# Patient Record
Sex: MALE | Race: WHITE | NOT HISPANIC OR LATINO | Employment: OTHER | URBAN - METROPOLITAN AREA
[De-identification: names, ages, dates, MRNs, and addresses within clinical notes are randomized per-mention and may not be internally consistent; named-entity substitution may affect disease eponyms.]

---

## 2019-09-09 ENCOUNTER — OFFICE VISIT (OUTPATIENT)
Dept: PODIATRY | Facility: CLINIC | Age: 69
End: 2019-09-09
Payer: COMMERCIAL

## 2019-09-09 VITALS
HEIGHT: 71 IN | WEIGHT: 240.8 LBS | HEART RATE: 88 BPM | BODY MASS INDEX: 33.71 KG/M2 | SYSTOLIC BLOOD PRESSURE: 148 MMHG | DIASTOLIC BLOOD PRESSURE: 61 MMHG

## 2019-09-09 DIAGNOSIS — E11.42 DIABETIC POLYNEUROPATHY ASSOCIATED WITH TYPE 2 DIABETES MELLITUS (HCC): Primary | ICD-10-CM

## 2019-09-09 PROCEDURE — 99213 OFFICE O/P EST LOW 20 MIN: CPT | Performed by: PODIATRIST

## 2019-09-09 NOTE — PROGRESS NOTES
Assessment/Plan:    Discussed principles of diabetic foot care  Patient is insensate for the most part but does have adequate arterial supply to his feet  Both feet legs are stain with hemosiderin secondary to venous insufficiency  Patient knows to refrain from walking barefoot  Yearly evaluation is recommended  No problem-specific Assessment & Plan notes found for this encounter  Diagnoses and all orders for this visit:    Diabetic polyneuropathy associated with type 2 diabetes mellitus ([address] )    Other orders  -     ASPIRIN 81 PO; Take 81 mg by mouth          Subjective:      Patient ID: Khurram Treviño is a 71 y o  male  HPI     Patient, a 70-year-old male with known diabetic neuropathy presents for diabetic foot assessment  Patient states that overall he is doing well  He has numbness in his feet but is very careful to always wear shoes  Patient taking insulin and states his blood sugar is under good control with last A1c at 6 5  Patient also has known venous insufficiency  The following portions of the patient's history were reviewed and updated as appropriate: allergies, current medications, past family history, past medical history, past social history, past surgical history and problem list     Review of Systems   Constitutional: Negative  Respiratory: Negative  Cardiovascular:        Venous insufficiency   Neurological: Positive for numbness  Objective:      /61   Pulse 88   Ht 5' 11" (1 803 m) Comment: verbal  Wt 109 kg (240 lb 12 8 oz)   BMI 33 58 kg/m²          Physical Exam   Cardiovascular: Pulses are weak pulses  Pulses:       Dorsalis pedis pulses are 2+ on the right side, and 2+ on the left side  Posterior tibial pulses are 0 on the right side, and 0 on the left side  Feet:   Right Foot:   Skin Integrity: Negative for ulcer, skin breakdown, erythema, warmth, callus or dry skin     Left Foot:   Skin Integrity: Negative for ulcer, skin breakdown, erythema, warmth, callus or dry skin  Diabetic Foot Exam    Patient's shoes and socks removed  Right Foot/Ankle   Right Foot Inspection  Skin Exam: skin normal and skin intact no dry skin, no warmth, no callus, no erythema, no maceration, no abnormal color, no pre-ulcer, no ulcer and no callus                          Toe Exam: ROM and strength within normal limits  Sensory   Vibration: absent  Proprioception: intact   Monofilament testing: diminished  Vascular  Capillary refills: < 3 seconds  The right DP pulse is 2+  The right PT pulse is 0  Right Toe  - Comprehensive Exam  Ecchymosis: none  Arch: normal  Hammertoes: absent  Claw Toes: absent  Swelling: none   Tenderness: none         Left Foot/Ankle  Left Foot Inspection  Skin Exam: skin normal and skin intactno dry skin, no warmth, no erythema, no maceration, normal color, no pre-ulcer, no ulcer and no callus                         Toe Exam: ROM and strength within normal limits                   Sensory   Vibration: absent  Proprioception: intact  Monofilament: diminished  Vascular  Capillary refills: < 3 seconds  The left DP pulse is 2+  The left PT pulse is 0  Left Toe  - Comprehensive Exam  Ecchymosis: none  Arch: normal  Hammertoes: absent  Claw toes: absent  Swelling: none   Tenderness: none       Assign Risk Category:  No deformity present;  Loss of protective sensation; Weak pulses       Risk: 1

## 2020-03-12 ENCOUNTER — OFFICE VISIT (OUTPATIENT)
Dept: PODIATRY | Facility: CLINIC | Age: 70
End: 2020-03-12
Payer: COMMERCIAL

## 2020-03-12 VITALS
HEART RATE: 92 BPM | DIASTOLIC BLOOD PRESSURE: 77 MMHG | SYSTOLIC BLOOD PRESSURE: 134 MMHG | HEIGHT: 71 IN | BODY MASS INDEX: 33.58 KG/M2

## 2020-03-12 DIAGNOSIS — S90.821A BLISTER OF FOOT, RIGHT, INITIAL ENCOUNTER: Primary | ICD-10-CM

## 2020-03-12 DIAGNOSIS — E11.42 DIABETIC POLYNEUROPATHY ASSOCIATED WITH TYPE 2 DIABETES MELLITUS (HCC): ICD-10-CM

## 2020-03-12 PROCEDURE — 99213 OFFICE O/P EST LOW 20 MIN: CPT | Performed by: PODIATRIST

## 2020-03-12 RX ORDER — CHLORTHALIDONE 25 MG/1
TABLET ORAL
COMMUNITY
Start: 2018-09-10

## 2020-03-12 RX ORDER — MELATONIN
5
COMMUNITY

## 2020-03-12 RX ORDER — INSULIN ASPART 100 [IU]/ML
INJECTION, SUSPENSION SUBCUTANEOUS
COMMUNITY
Start: 2012-11-09

## 2020-03-12 RX ORDER — KRILL/OM-3/DHA/EPA/PHOSPHO/AST 500MG-86MG
1 CAPSULE ORAL
COMMUNITY

## 2020-03-12 RX ORDER — RIBOFLAVIN (VITAMIN B2) 100 MG
1 TABLET ORAL
COMMUNITY

## 2020-03-12 RX ORDER — MAGNESIUM 200 MG
1 TABLET ORAL
COMMUNITY

## 2020-03-12 RX ORDER — ATORVASTATIN CALCIUM 20 MG/1
TABLET, FILM COATED ORAL
COMMUNITY
Start: 2020-01-13

## 2020-03-12 RX ORDER — LISINOPRIL 10 MG/1
1 TABLET ORAL DAILY
COMMUNITY
Start: 2013-07-26

## 2020-03-12 NOTE — PROGRESS NOTES
Assessment/Plan:  Explained to patient that he is dealing with a blister on the bottom of the right foot that is healing without evidence of infection or problem  He does not need to apply antibiotics to the area  No aggressive treatment needed  Patient will be rescheduled in approximately 6 months for diabetic foot exam  No problem-specific Assessment & Plan notes found for this encounter  Diagnoses and all orders for this visit:    Blister of foot, right, initial encounter    Diabetic polyneuropathy associated with type 2 diabetes mellitus ([facility] [address] )    Other orders  -     insulin aspart protamine-insulin aspart (NovoLOG 70/30) 100 units/mL injection; Inject under the skin  -     insulin glargine (Toujeo SoloStar) 300 units/mL CONCETRATED U-300 injection pen (1-unit dial); INJECT 28 UNITS UNDER THE SKIN DAILY  -     atorvastatin (LIPITOR) 20 mg tablet  -     Ascorbic Acid (VITAMIN C) 100 MG tablet; Take 1 tablet by mouth  -     chlorthalidone 25 mg tablet; TAKE ONE-HALF TABLET ONCE DAILY  -     cholecalciferol (VITAMIN D3) 1,000 units tablet; Take 5 tablets by mouth  -     Cyanocobalamin 1000 MCG SUBL; Take 1 tablet by mouth  -     insulin glargine (TOUJEO MAX) 300 units/mL CONCETRATED U-300 injection pen (2-unit dial); as directed  -     Krill Oil 500 MG CAPS; 1 capsule  -     liraglutide (Victoza) injection; Inject under the skin  -     lisinopril (ZESTRIL) 10 mg tablet; Take 1 tablet by mouth daily  -     Magnesium 200 MG TABS; 1 tablet          Subjective:      Patient ID: Priscila Arguello is a 79 y o  male  HPI     Patient, a 51-year-old diabetic with known neuropathy along with renal disease presents with concern regarding his right foot  Patient was in [place] and was walking on the beach approximately 1 week ago  He developed a blister on the bottom of the right foot  He initially had pain but now the pain is gone    Patient wants to make sure that everything was okay and that he is not infected  The following portions of the patient's history were reviewed and updated as appropriate: allergies, current medications, past family history, past medical history, past social history, past surgical history and problem list     Review of Systems   Cardiovascular:        Venous disease   Genitourinary:        Chronic renal disease   Musculoskeletal: Negative  Neurological: Positive for numbness  Hematological: Negative  Objective:      /77   Pulse 92   Ht 5' 11" (1 803 m)   BMI 33 58 kg/m²          Physical Exam   Cardiovascular:   DP +2/4 bilateral; PT 0/4 bilateral; both legs are stained with hemosiderin  Musculoskeletal: Normal range of motion  He exhibits no deformity  Neurological: He exhibits normal muscle tone  Skin:   Resolving blister present beneath 1st metatarsal head right foot    No drainage or sign of infection

## 2020-09-08 ENCOUNTER — OFFICE VISIT (OUTPATIENT)
Dept: PODIATRY | Facility: CLINIC | Age: 70
End: 2020-09-08
Payer: COMMERCIAL

## 2020-09-08 VITALS
BODY MASS INDEX: 33.58 KG/M2 | HEART RATE: 101 BPM | TEMPERATURE: 97 F | HEIGHT: 71 IN | DIASTOLIC BLOOD PRESSURE: 75 MMHG | SYSTOLIC BLOOD PRESSURE: 117 MMHG

## 2020-09-08 DIAGNOSIS — M76.61 ACHILLES TENDINITIS OF RIGHT LOWER EXTREMITY: ICD-10-CM

## 2020-09-08 DIAGNOSIS — E11.42 DIABETIC POLYNEUROPATHY ASSOCIATED WITH TYPE 2 DIABETES MELLITUS (HCC): Primary | ICD-10-CM

## 2020-09-08 PROCEDURE — 99213 OFFICE O/P EST LOW 20 MIN: CPT | Performed by: PODIATRIST

## 2021-03-09 ENCOUNTER — OFFICE VISIT (OUTPATIENT)
Dept: PODIATRY | Facility: CLINIC | Age: 71
End: 2021-03-09
Payer: COMMERCIAL

## 2021-03-09 VITALS
WEIGHT: 242 LBS | HEART RATE: 94 BPM | SYSTOLIC BLOOD PRESSURE: 145 MMHG | BODY MASS INDEX: 33.88 KG/M2 | HEIGHT: 71 IN | DIASTOLIC BLOOD PRESSURE: 80 MMHG

## 2021-03-09 DIAGNOSIS — E11.42 DIABETIC POLYNEUROPATHY ASSOCIATED WITH TYPE 2 DIABETES MELLITUS (HCC): Primary | ICD-10-CM

## 2021-03-09 DIAGNOSIS — L98.9 SKIN ABNORMALITIES: ICD-10-CM

## 2021-03-09 PROCEDURE — 99213 OFFICE O/P EST LOW 20 MIN: CPT | Performed by: PODIATRIST

## 2021-03-09 RX ORDER — OMEGA-3 FATTY ACIDS CAP DELAYED RELEASE 1000 MG 1000 MG
4000 CAPSULE DELAYED RELEASE ORAL
COMMUNITY

## 2021-03-09 RX ORDER — FUROSEMIDE 20 MG/1
10 TABLET ORAL DAILY
COMMUNITY

## 2021-03-09 RX ORDER — MAGNESIUM GLUCONATE 30 MG(550)
30 TABLET ORAL DAILY
COMMUNITY

## 2021-03-09 RX ORDER — PATIROMER 8.4 G/1
POWDER, FOR SUSPENSION ORAL
COMMUNITY
Start: 2021-03-04

## 2021-03-09 RX ORDER — EXENATIDE 250 UG/ML
INJECTION SUBCUTANEOUS
COMMUNITY
Start: 2013-08-21

## 2021-03-09 RX ORDER — FLUOCINONIDE 0.5 MG/G
OINTMENT TOPICAL 2 TIMES DAILY
Qty: 30 G | Refills: 0 | Status: SHIPPED | OUTPATIENT
Start: 2021-03-09

## 2021-03-09 RX ORDER — PANTOPRAZOLE SODIUM 40 MG/1
40 TABLET, DELAYED RELEASE ORAL
COMMUNITY
Start: 2020-11-23

## 2021-03-09 NOTE — PROGRESS NOTES
Assessment/Plan:      Patient has known diabetic neuropathy along with known renal disease and poor circulation  He knows to refrain from walking barefoot  Lidex ointment was prescribed for this skin fissure on the left foot  He will apply b i d  to the affected area  He is rescheduled in 6 months  No problem-specific Assessment & Plan notes found for this encounter  Diagnoses and all orders for this visit:    Diabetic polyneuropathy associated with type 2 diabetes mellitus ([facility] [address] )    Skin abnormalities  -     fluocinonide (LIDEX) 0 05 % ointment; Apply topically 2 (two) times a day    Other orders  -     Omega-3 Fatty Acids (Fish Oil) 1000 MG CPDR; Take 4,000 Units by mouth  -     Magnesium Gluconate 550 MG TABS; Take 30 mg by mouth daily  -     vitamin E 100 UNIT capsule; Take 100 Units by mouth daily  -     Cholecalciferol 1 25 MG (06462 UT) TABS; Take 5,000 Units by mouth daily  -     Exenatide (Byetta 10 MCG Pen) 10 MCG/0 04ML SOPN; Inject under the skin  -     furosemide (LASIX) 20 mg tablet; Take 10 mg by mouth daily   -     pantoprazole (PROTONIX) 40 mg tablet; Take 40 mg by mouth  -     Veltassa 8 4 g PACK; TAKE 1 PACKET BY MOUTH DAILY  -     insulin aspart (NovoLOG) 100 units/mL injection; Inject under the skin          Subjective:      Patient ID: Miah Coleman is a 70 y o  male  HPI       Patient, a 72-year-old type 2 diabetic utilizing insulin for control presents for examination and care  Patient states that he has recently been hospitalized for pericarditis  Also that he has stage IV renal disease  No A1c is noted in the chart but blood sugar is consistently in the range of 200  Both soles of feet are numb per [place] monofilament however patient complains of pain due to a small fissure within the left arch  Blood glucose close level dated 03/05/21 was 250  BUN was markedly elevated as well as creatinine       Blood glucose level of 12/28/2020 was 196 with a BUN of 92         The following portions of the patient's history were reviewed and updated as appropriate: allergies, current medications, past family history, past medical history, past social history, past surgical history and problem list     Review of Systems   Cardiovascular:        History of cardiac disease; history of venous insufficiency   Genitourinary:        Stage IV renal disease   Musculoskeletal: Negative  Neurological: Positive for numbness  Psychiatric/Behavioral: Negative  Objective:      /80   Pulse 94   Ht 5' 11" (1 803 m) Comment: verbal  Wt 110 kg (242 lb)   BMI 33 75 kg/m²          Physical Exam  Cardiovascular:      Pulses: Pulses are weak  Dorsalis pedis pulses are 1+ on the right side and 1+ on the left side  Posterior tibial pulses are 0 on the right side and 0 on the left side  Feet:      Right foot:      Skin integrity: No ulcer, skin breakdown, erythema, warmth, callus or dry skin  Left foot:      Skin integrity: No ulcer, skin breakdown, erythema, warmth, callus or dry skin  Diabetic Foot Exam    Patient's shoes and socks removed  Right Foot/Ankle   Right Foot Inspection  Skin Exam: skin normal, skin intact and abnormal color no dry skin, no warmth, no callus, no erythema, no maceration, no pre-ulcer, no ulcer and no callus                          Toe Exam: ROM and strength within normal limits  Sensory   Vibration: absent  Proprioception: intact   Monofilament testing: absent  Vascular  Capillary refills: elevated  The right DP pulse is 1+  The right PT pulse is 0     Right Toe  - Comprehensive Exam  Ecchymosis: none  Arch: normal  Hammertoes: absent  Claw Toes: absent  Swelling: none   Tenderness: none         Left Foot/Ankle  Left Foot Inspection  Skin Exam: skin normal, skin intact and abnormal colorno dry skin, no warmth, no erythema, no maceration, no pre-ulcer, no ulcer and no callus                         Toe Exam: ROM and strength within normal limits                   Sensory   Vibration: absent  Proprioception: intact  Monofilament: absent  Vascular  Capillary refills: elevated  The left DP pulse is 1+  The left PT pulse is 0  Left Toe  - Comprehensive Exam  Ecchymosis: none  Arch: normal  Hammertoes: absent  Claw toes: absent  Swelling: none   Tenderness: none       Assign Risk Category:  No deformity present;  No loss of protective sensation; Weak pulses       Risk: 1

## 2022-02-08 ENCOUNTER — OFFICE VISIT (OUTPATIENT)
Dept: PODIATRY | Facility: CLINIC | Age: 72
End: 2022-02-08
Payer: COMMERCIAL

## 2022-02-08 ENCOUNTER — TELEPHONE (OUTPATIENT)
Dept: PODIATRY | Facility: CLINIC | Age: 72
End: 2022-02-08

## 2022-02-08 VITALS
HEIGHT: 71 IN | DIASTOLIC BLOOD PRESSURE: 75 MMHG | WEIGHT: 247 LBS | SYSTOLIC BLOOD PRESSURE: 119 MMHG | BODY MASS INDEX: 34.58 KG/M2 | HEART RATE: 94 BPM

## 2022-02-08 DIAGNOSIS — E11.42 DIABETIC POLYNEUROPATHY ASSOCIATED WITH TYPE 2 DIABETES MELLITUS (HCC): Primary | ICD-10-CM

## 2022-02-08 DIAGNOSIS — S90.851A FOREIGN BODY IN RIGHT FOOT, INITIAL ENCOUNTER: ICD-10-CM

## 2022-02-08 PROCEDURE — 99213 OFFICE O/P EST LOW 20 MIN: CPT | Performed by: PODIATRIST

## 2022-02-08 RX ORDER — CEPHALEXIN 500 MG/1
500 CAPSULE ORAL 4 TIMES DAILY
Qty: 40 CAPSULE | Refills: 0 | Status: SHIPPED | OUTPATIENT
Start: 2022-02-08 | End: 2022-02-18

## 2022-02-08 RX ORDER — SIMVASTATIN 20 MG
20 TABLET ORAL
COMMUNITY

## 2022-02-08 NOTE — TELEPHONE ENCOUNTER
Spoke to [employer] daughter and scheduled pt to be seen today at 2:45pm in our [place] office with Dr Posada Scarce

## 2022-02-08 NOTE — PROGRESS NOTES
Assessment/Plan:    I personally reviewed x-rays of the right foot  There is no evidence of foreign body such as a nail  Patient placed on cephalexin 500 mg q i d   Patient advised to contact me should foot become swollen or drainage occur from the puncture wound  Patient told to go to the ED for a tetanus shot  No problem-specific Assessment & Plan notes found for this encounter  Diagnoses and all orders for this visit:    Diabetic polyneuropathy associated with type 2 diabetes mellitus ([address] )    Foreign body in right foot, initial encounter  -     X-ray foot right 3+ views; Future  -     cephalexin (KEFLEX) 500 mg capsule; Take 1 capsule (500 mg total) by mouth 4 (four) times a day for 10 days    Other orders  -     simvastatin (ZOCOR) 20 mg tablet; Take 20 mg by mouth          Subjective:      Patient ID: Marvin Lowe is a 67 y o  male  HPI     Patient, a 77-year-old male with known diabetic neuropathy presents with concern regarding his right foot  Approximately 2 hours earlier he stepped on a nail that went through his shoe and sock  He has no pain as he is neuropathic  Patient does not recall having a recent tetanus shot  The following portions of the patient's history were reviewed and updated as appropriate: allergies, current medications, past family history, past medical history, past social history, past surgical history and problem list     Review of Systems   Genitourinary:        Renal disease   Musculoskeletal: Negative  Neurological: Positive for numbness  Psychiatric/Behavioral: Negative  Objective:      /75   Pulse 94   Ht 5' 11" (1 803 m)   Wt 112 kg (247 lb)   BMI 34 45 kg/m²          Physical Exam  Constitutional:       Appearance: Normal appearance  Cardiovascular:      Comments: No palpable pedal pulses bilateral; poor skin turgor  Musculoskeletal:         General: Signs of injury present        Comments: Extremely small puncture wound noted plantar aspect right foot in the region of the 1st metatarsal interspace  There is no bleeding  No evidence of cellulitis  No drainage present   Skin:     Findings: Erythema present  Comments: Bilateral erythema but this erythema is present prior to stepping on foreign body  Neurological:      General: No focal deficit present  Mental Status: He is oriented to person, place, and time  Sensory: Sensory deficit present

## 2022-02-08 NOTE — TELEPHONE ENCOUNTER
Jeri called, Darryl [place] a nail  He does not think he got the entire nail out of his foot  As he is concerned, they were hoping he could be seen today    Call either way, call Darryl Raymond first and if he does not answer, call Meryl Li @ [phone]

## 2023-03-16 ENCOUNTER — OFFICE VISIT (OUTPATIENT)
Dept: PODIATRY | Facility: CLINIC | Age: 73
End: 2023-03-16

## 2023-03-16 ENCOUNTER — TELEPHONE (OUTPATIENT)
Dept: PODIATRY | Facility: CLINIC | Age: 73
End: 2023-03-16

## 2023-03-16 VITALS
HEART RATE: 106 BPM | WEIGHT: 246 LBS | DIASTOLIC BLOOD PRESSURE: 83 MMHG | BODY MASS INDEX: 34.44 KG/M2 | SYSTOLIC BLOOD PRESSURE: 132 MMHG | HEIGHT: 71 IN

## 2023-03-16 DIAGNOSIS — N28.9 RENAL DISEASE: ICD-10-CM

## 2023-03-16 DIAGNOSIS — E11.42 DIABETIC POLYNEUROPATHY ASSOCIATED WITH TYPE 2 DIABETES MELLITUS (HCC): ICD-10-CM

## 2023-03-16 DIAGNOSIS — I87.2 PERIPHERAL VENOUS INSUFFICIENCY: Primary | ICD-10-CM

## 2023-03-16 NOTE — PROGRESS NOTES
Assessment/Plan:    Explained to patient that he is dealing with severe venous insufficiency and marked varicosities  He was referred to vascular for their assessment and care  No problem-specific Assessment & Plan notes found for this encounter  Diagnoses and all orders for this visit:    Peripheral venous insufficiency  -     Ambulatory Referral to Vascular Surgery; Future    Diabetic polyneuropathy associated with type 2 diabetes mellitus (HCC)    Renal disease          Subjective:      Patient ID: Lionel Carias is a 68 y o  male  HPI     Patient, a 60-year-old type II diabetic male with known stage IV renal disease presents with concern regarding the color of his legs  He also is concerned as he feels that he has blisters on his legs that are about to burst   He has not had any oozing or drainage at this time  He states that he is taking a diuretic to keep leg swelling under control  No foot discomfort related but it is noted the patient has significant diabetic neuropathy and is insensate  The following portions of the patient's history were reviewed and updated as appropriate: allergies, current medications, past family history, past medical history, past social history, past surgical history and problem list     Review of Systems   Genitourinary:        Stage IV renal disease   Neurological: Positive for numbness  Psychiatric/Behavioral: Negative  Objective:      /83 (BP Location: Left arm, Patient Position: Sitting, Cuff Size: Standard)   Pulse (!) 106   Ht 5' 11" (1 803 m)   Wt 112 kg (246 lb)   BMI 34 31 kg/m²          Physical Exam  Constitutional:       Appearance: Normal appearance  Cardiovascular:      Pulses: Normal pulses  Comments: Both legs are brown/black secondary to venous insufficiency  Severe varicosities present each lower extremity  Genitourinary:     Rectum: Guaiac stool:      Musculoskeletal:         General: Normal range of motion  Skin:     Comments: Hemosiderin deposits lower legs bilateral   Neurological:      General: No focal deficit present  Mental Status: He is oriented to person, place, and time  Sensory: Sensory deficit present  Comments: Patient is insensate soles of both feet

## 2023-03-16 NOTE — TELEPHONE ENCOUNTER
Caller: Erin Adam    Doctor/Office: Dr Romy Pereira    #: [phone]    Escalation: Appointment Patient and his daughter Clemencia Cantu ([phone]) both called in together to get an appt for sores on his lower legs with blisters  Unable to take todays same day slot or any upcoming appts I could offer before 4-17  He took that appt but is requesting to be seen sooner if possible  Please call and advise

## 2023-03-20 NOTE — TELEPHONE ENCOUNTER
Spoke to patient who states that he was seen in the office by Dr Francisco Javier Cash on 3/16/23 and ref for a vascular consult (scheduled for May 2023)  Pt states he inspected his legs, feet and toes daily and will be in touch with the office again if anything changes as far as the status of his "wounds"

## 2023-05-19 ENCOUNTER — CONSULT (OUTPATIENT)
Dept: VASCULAR SURGERY | Facility: CLINIC | Age: 73
End: 2023-05-19

## 2023-05-19 VITALS
OXYGEN SATURATION: 98 % | DIASTOLIC BLOOD PRESSURE: 82 MMHG | BODY MASS INDEX: 32.79 KG/M2 | WEIGHT: 234.2 LBS | HEART RATE: 97 BPM | SYSTOLIC BLOOD PRESSURE: 130 MMHG | HEIGHT: 71 IN

## 2023-05-19 DIAGNOSIS — I83.93 ASYMPTOMATIC VARICOSE VEINS OF BOTH LOWER EXTREMITIES: Primary | ICD-10-CM

## 2023-05-19 DIAGNOSIS — I87.2 VENOUS STASIS DERMATITIS OF BOTH LOWER EXTREMITIES: ICD-10-CM

## 2023-05-19 DIAGNOSIS — I87.2 PERIPHERAL VENOUS INSUFFICIENCY: ICD-10-CM

## 2023-05-19 RX ORDER — SEMAGLUTIDE 2.68 MG/ML
INJECTION, SOLUTION SUBCUTANEOUS
COMMUNITY
Start: 2023-05-10

## 2023-05-19 NOTE — ASSESSMENT & PLAN NOTE
Pt referred to the office for B/l leg swelling with venous stasis  Pt states he has had discoloration of the b/l legs for the past 10 years  He does not currently wear compression  No recent testing for review   -Palpable bilateral DP and PT pulses   -Pt noted to have large B/L varicose veins that are bulging  States these do not currently give him any pain or bother him at this time  Recommendations  -Return for f/u in 3 months to discuss leg swelling and alleviation of symptoms with compression and elevation    -Discussed with pt pathophysiology of venous insufficiency and treatment recommendations   -Conservative treatment measurements recommended at this time with use of b/l leg compression, elevation and increased exercise  -RX for compression given today with instruction on use

## 2023-05-19 NOTE — PROGRESS NOTES
Assessment/Plan:    Peripheral venous insufficiency  Pt referred to the office for B/l leg swelling with venous stasis  Pt states he has had discoloration of the b/l legs for the past 10 years  He does not currently wear compression  No recent testing for review   -Palpable bilateral DP and PT pulses   -Pt noted to have large B/L varicose veins that are bulging  States these do not currently give him any pain or bother him at this time  Recommendations  -Return for f/u in 3 months to discuss leg swelling and alleviation of symptoms with compression and elevation    -Discussed with pt pathophysiology of venous insufficiency and treatment recommendations   -Conservative treatment measurements recommended at this time with use of b/l leg compression, elevation and increased exercise  -RX for compression given today with instruction on use  Diagnoses and all orders for this visit:    Asymptomatic varicose veins of both lower extremities    Peripheral venous insufficiency  -     Ambulatory Referral to Vascular Surgery  -     Compression Stocking  -     Compression Stocking    Venous stasis dermatitis of both lower extremities    Other orders  -     Ozempic, 2 MG/DOSE, 8 MG/3ML injection pen; INJECT 2 MG SUBCUTANEOUSLY ONE TIME PER WEEK          Subjective:      Patient ID: Alfonso Gordon is a 68 y o  male  Patient is new to our office, referred by Eric Du DPM  Patient presents today B/L PVD  He c/o discoloration in both legs, numbness and tingling in his feet  He does have bulging veins on both legs  He denies any wounds, claudication, pain with elevation  Patient takes ASA 81mg and Atorvastatin  Patient has never smoked  35-year-old male with PMHx diabetes, pericarditis, obesity with BMI 32 66 kg/m, varicose veins in the office today as new referral by Eric Du, his podiatrist      States he does not have any leg pain  States he has b/l leg swelling that comes and goes   He states he "takes a water pill and that helps with his swelling  States the b/l leg discoloration started 10 years ago  He states he puts Eucerin lotion on his legs every day  States when he stands in one spot for a length of time his R leg will fall asleep  Denies claudication, denies rest pain, denies wounds  Currently taking aspirin 325 mg and atorvastatin daily  The following portions of the patient's history were reviewed and updated as appropriate: allergies, current medications, past family history, past medical history, past social history, past surgical history and problem list     Review of Systems   Constitutional: Negative  HENT: Negative  Eyes: Negative  Respiratory: Negative  Negative for shortness of breath  Cardiovascular: Positive for leg swelling  Negative for chest pain  Gastrointestinal: Negative  Endocrine: Negative  Genitourinary: Negative  Musculoskeletal: Negative  Skin: Positive for color change  Allergic/Immunologic: Negative  Neurological: Negative  Hematological: Negative  Psychiatric/Behavioral: Negative  Objective:      /82 (BP Location: Right arm, Patient Position: Sitting, Cuff Size: Standard)   Pulse 97   Ht 5' 11\" (1 803 m)   Wt 106 kg (234 lb 3 2 oz)   SpO2 98%   BMI 32 66 kg/m²          Physical Exam      I have reviewed and made appropriate changes to the review of systems input by the medical assistant  Vitals:    05/19/23 1503   BP: 130/82   BP Location: Right arm   Patient Position: Sitting   Cuff Size: Standard   Pulse: 97   SpO2: 98%   Weight: 106 kg (234 lb 3 2 oz)   Height: 5' 11\" (1 803 m)       Patient Active Problem List   Diagnosis   • Peripheral venous insufficiency       History reviewed  No pertinent surgical history      Family History   Problem Relation Age of Onset   • Cancer Family    • Diabetes Family    • Heart disease Family    • Hypertension Family        Social History     Socioeconomic History   • " Marital status: /Civil Union     Spouse name: Not on file   • Number of children: Not on file   • Years of education: Not on file   • Highest education level: Not on file   Occupational History   • Not on file   Tobacco Use   • Smoking status: Never   • Smokeless tobacco: Never   Vaping Use   • Vaping Use: Never used   Substance and Sexual Activity   • Alcohol use: Not Currently   • Drug use: Not on file   • Sexual activity: Not on file   Other Topics Concern   • Not on file   Social History Narrative   • Not on file     Social Determinants of Health     Financial Resource Strain: Not on file   Food Insecurity: Not on file   Transportation Needs: Not on file   Physical Activity: Not on file   Stress: Not on file   Social Connections: Not on file   Intimate Partner Violence: Not on file   Housing Stability: Not on file       Allergies   Allergen Reactions   • Cat Hair Extract    • Morphine Other (See Comments)         Current Outpatient Medications:   •  Ascorbic Acid (VITAMIN C) 100 MG tablet, Take 1 tablet by mouth, Disp: , Rfl:   •  ASPIRIN 81 PO, Take 81 mg by mouth, Disp: , Rfl:   •  chlorthalidone 25 mg tablet, TAKE ONE-HALF TABLET ONCE DAILY, Disp: , Rfl:   •  cholecalciferol (VITAMIN D3) 1,000 units tablet, Take 5 tablets by mouth, Disp: , Rfl:   •  Cholecalciferol 1 25 MG (08748 UT) TABS, Take 5,000 Units by mouth daily, Disp: , Rfl:   •  Cyanocobalamin 1000 MCG SUBL, Take 1 tablet by mouth, Disp: , Rfl:   •  Exenatide (Byetta 10 MCG Pen) 10 MCG/0 04ML SOPN, Inject under the skin, Disp: , Rfl:   •  fluocinonide (LIDEX) 0 05 % ointment, Apply topically 2 (two) times a day, Disp: 30 g, Rfl: 0  •  furosemide (LASIX) 20 mg tablet, Take 10 mg by mouth daily , Disp: , Rfl:   •  insulin aspart (NovoLOG) 100 units/mL injection, Inject under the skin, Disp: , Rfl:   •  insulin aspart protamine-insulin aspart (NovoLOG 70/30) 100 units/mL injection, Inject under the skin, Disp: , Rfl:   •  insulin glargine (TOUJEO MAX) 300 units/mL CONCETRATED U-300 injection pen (2-unit dial), as directed, Disp: , Rfl:   •  insulin glargine (TOUJEO) 300 units/mL CONCENTRATED U-300 injection pen (1-unit dial), INJECT 28 UNITS UNDER THE SKIN DAILY  , Disp: , Rfl:   •  Krill Oil 500 MG CAPS, 1 capsule, Disp: , Rfl:   •  liraglutide (VICTOZA) injection, Inject under the skin, Disp: , Rfl:   •  lisinopril (ZESTRIL) 10 mg tablet, Take 1 tablet by mouth daily, Disp: , Rfl:   •  Magnesium 200 MG TABS, 1 tablet, Disp: , Rfl:   •  Magnesium Gluconate 550 MG TABS, Take 30 mg by mouth daily, Disp: , Rfl:   •  Omega-3 Fatty Acids (Fish Oil) 1000 MG CPDR, Take 4,000 Units by mouth, Disp: , Rfl:   •  Ozempic, 2 MG/DOSE, 8 MG/3ML injection pen, INJECT 2 MG SUBCUTANEOUSLY ONE TIME PER WEEK, Disp: , Rfl:   •  pantoprazole (PROTONIX) 40 mg tablet, Take 40 mg by mouth, Disp: , Rfl:   •  Veltassa 8 4 g PACK, TAKE 1 PACKET BY MOUTH DAILY, Disp: , Rfl:   •  vitamin E 100 UNIT capsule, Take 100 Units by mouth daily, Disp: , Rfl:   •  atorvastatin (LIPITOR) 20 mg tablet, , Disp: , Rfl:   •  simvastatin (ZOCOR) 20 mg tablet, Take 20 mg by mouth (Patient not taking: Reported on 5/19/2023), Disp: , Rfl:   I have spent a total time of 40 minutes on 05/19/23 in caring for this patient including Risks and benefits of tx options, Instructions for management, Patient and family education, Risk factor reductions, Impressions and Obtaining or reviewing history

## 2023-05-19 NOTE — PATIENT INSTRUCTIONS
"- Call the office if you experience any changes to your legs or feet such as new pain, redness or swelling     - Stay active  Exercise everyday  Walking is the recommended exercise with a goal of 30 min 3-4 times a week  A healthy weight can assist in decreasing varicose vein symptoms      - Wear Compression  Put them on in the morning, wear all day and take off before bed at night      -Elevate your legs above the level of your heart  Elevate for 15 minutes 3-4 times a day  -When looking at buying compression, look for \"gradient compression\" with a weight 20-30mmHg (medium weight), knee high is fine   -Try \"[employer]\"    -A good brand is [employer] and Sigvaris, knee high     "

## 2023-08-23 ENCOUNTER — OFFICE VISIT (OUTPATIENT)
Dept: VASCULAR SURGERY | Facility: CLINIC | Age: 73
End: 2023-08-23
Payer: COMMERCIAL

## 2023-08-23 VITALS
SYSTOLIC BLOOD PRESSURE: 110 MMHG | BODY MASS INDEX: 32.34 KG/M2 | WEIGHT: 231 LBS | DIASTOLIC BLOOD PRESSURE: 60 MMHG | HEIGHT: 71 IN | OXYGEN SATURATION: 100 % | HEART RATE: 95 BPM

## 2023-08-23 DIAGNOSIS — I87.2 PERIPHERAL VENOUS INSUFFICIENCY: Primary | ICD-10-CM

## 2023-08-23 DIAGNOSIS — R09.89 DECREASED PEDAL PULSES: ICD-10-CM

## 2023-08-23 PROCEDURE — 99213 OFFICE O/P EST LOW 20 MIN: CPT | Performed by: NURSE PRACTITIONER

## 2023-08-23 RX ORDER — INSULIN ASPART 100 [IU]/ML
INJECTION, SOLUTION INTRAVENOUS; SUBCUTANEOUS
COMMUNITY
Start: 2023-08-11

## 2023-08-23 RX ORDER — EMPAGLIFLOZIN 10 MG/1
10 TABLET, FILM COATED ORAL EVERY MORNING
COMMUNITY
Start: 2023-08-02

## 2023-08-23 NOTE — ASSESSMENT & PLAN NOTE
49-year-old male returns to the office for follow up with b/l leg swelling. Pt reports that he did not order or use compression or leg elevation since last office visit. States he was in [place] and just recently returned. Denies any new or worsening symptoms.     -Pt has significant b/l venous stasis dermatitis on b/l ankles with b/l leg swelling. He denies pain in the legs. -Denies claudication, rest pain, wounds/ tissue loss.  -Pt noted to have decreased b/l pedal pulses. Will obtain LEAD to r/o any arterial disease.  -Pt educated on pathophysiology of venous insuffiencey and indications for intervention. Educated on conservative treatment at this time and discussed importance of b/l compression, elevation and increased walking and weight loss. Pt verbalized understanding and is agreeable to this plan. Recommendations  -Complete LEAD and the office will call if you need to return prior to 3 months for rview. presents with symptomatic varicose veins. Recommendations   -Return to the office in 3 months for follow up.   -Start wearing compression. RX provided at prior visit, given education on how to use. -Leg elevation. Goal of 3-4 times a day 15 minutes at a time.  -Increase exercise and ambulation. Goal of 3-4 times a week for 30 min. -Apply moisturizing cream to the b/l legs to maintain skin integrity and prevent breakdown.  -Call the office with any new or worsening symptoms.

## 2023-08-23 NOTE — PROGRESS NOTES
Assessment/Plan:    Peripheral venous insufficiency  77-year-old male returns to the office for follow up with b/l leg swelling. Pt reports that he did not order or use compression or leg elevation since last office visit. States he was in [place] and just recently returned. Denies any new or worsening symptoms.     -Pt has significant b/l venous stasis dermatitis on b/l ankles with b/l leg swelling. He denies pain in the legs. -Denies claudication, rest pain, wounds/ tissue loss.  -Pt noted to have decreased b/l pedal pulses. Will obtain LEAD to r/o any arterial disease.  -Pt educated on pathophysiology of venous insuffiencey and indications for intervention. Educated on conservative treatment at this time and discussed importance of b/l compression, elevation and increased walking and weight loss. Pt verbalized understanding and is agreeable to this plan. Recommendations  -Complete LEAD and the office will call if you need to return prior to 3 months for rview. presents with symptomatic varicose veins. Recommendations   -Return to the office in 3 months for follow up.   -Start wearing compression. RX provided at prior visit, given education on how to use. -Leg elevation. Goal of 3-4 times a day 15 minutes at a time.  -Increase exercise and ambulation. Goal of 3-4 times a week for 30 min. -Apply moisturizing cream to the b/l legs to maintain skin integrity and prevent breakdown.  -Call the office with any new or worsening symptoms. Diagnoses and all orders for this visit:    Peripheral venous insufficiency    Decreased pedal pulses  -     VAS lower limb arterial duplex, complete bilateral; Future    Other orders  -     Continuous Blood Gluc Sensor (FreeStyle Trisha 2 Sensor[place] [facility] [place] TO TEST BLOOD SUGARS 4 TIMES A DAY. REPLACE EVERY 14 DAYS  -     Jardiance 10 MG TABS tablet;  Take 10 mg by mouth every morning  -     NovoLOG FlexPen 100 units/mL injection pen; INJECT 3 UNITS UNDER THE SKIN 3 TIMES A DAY BEFORE MEALS          Subjective:      Patient ID: Mica Rodriguez is a 68 y.o. male. Patient is here today for a 3 month f/u exam for b/l LE swelling. He admits to having varicose veins but he denies any pain from them. He denies wearing his compression stockings. He denies any swelling. Patient has discoloration in both legs from mid shin to feet. Patient is taking ASA 81 mg and Atorvastatin. Patient is a non-smoker. 58-year-old male with PMHx diabetes, pericarditis, obesity with BMI 32.66 kg/m, varicose veins in the office today as new referral by Jose Morse, his podiatrist.    Pt denies any leg pain, denies claudication, denies true rest pain- has mild cramping in the morning occasionally, denies wounds/ tissue loss. Pt has significant venous stasis dermatitis on b/l ankles with swelling. He continues to take diuretic medication for swelling and reports this gives him adequate relief. He states he puts Eucerin lotion on his legs every day. -Reports that since his last OV he did not purchase compression or perform any leg elevation he just returned from month long trip in [place]. Currently taking aspirin 325 mg and atorvastatin daily. The following portions of the patient's history were reviewed and updated as appropriate: allergies, current medications, past family history, past medical history, past social history, past surgical history and problem list.    Review of Systems   Constitutional: Negative. HENT: Negative. Eyes: Negative. Respiratory: Negative. Cardiovascular: Negative. Gastrointestinal: Negative. Endocrine: Negative. Genitourinary: Negative. Musculoskeletal: Negative. Skin: Positive for color change. Allergic/Immunologic: Positive for environmental allergies. Neurological: Negative. Hematological: Negative. Psychiatric/Behavioral: Negative.           Objective:      /60 (BP Location: Left arm, Patient Position: Sitting, Cuff Size: Large)   Pulse 95   Ht 5' 11" (1.803 m)   Wt 105 kg (231 lb)   SpO2 100%   BMI 32.22 kg/m²          Physical Exam  Vitals reviewed. Constitutional:       Appearance: Normal appearance. HENT:      Head: Normocephalic and atraumatic. Cardiovascular:      Pulses:           Popliteal pulses are 1+ on the right side and 1+ on the left side. Dorsalis pedis pulses are 1+ on the right side and 1+ on the left side. Posterior tibial pulses are 0 on the right side and 0 on the left side. Pulmonary:      Effort: Pulmonary effort is normal.   Musculoskeletal:         General: No tenderness. Right lower leg: Edema present. Left lower leg: Edema present. Skin:     General: Skin is warm and dry. Capillary Refill: Capillary refill takes 2 to 3 seconds. Neurological:      General: No focal deficit present. Mental Status: He is alert and oriented to person, place, and time. Psychiatric:         Mood and Affect: Mood normal.         Behavior: Behavior normal.           I have reviewed and made appropriate changes to the review of systems input by the medical assistant. Vitals:    08/23/23 1101   BP: 110/60   BP Location: Left arm   Patient Position: Sitting   Cuff Size: Large   Pulse: 95   SpO2: 100%   Weight: 105 kg (231 lb)   Height: 5' 11" (1.803 m)       Patient Active Problem List   Diagnosis   • Peripheral venous insufficiency   • Decreased pedal pulses       History reviewed. No pertinent surgical history.     Family History   Problem Relation Age of Onset   • Cancer Family    • Diabetes Family    • Heart disease Family    • Hypertension Family        Social History     Socioeconomic History   • Marital status: /Civil Union     Spouse name: Not on file   • Number of children: Not on file   • Years of education: Not on file   • Highest education level: Not on file   Occupational History   • Not on file   Tobacco Use   • Smoking status: Never   • Smokeless tobacco: Never   Vaping Use   • Vaping Use: Never used   Substance and Sexual Activity   • Alcohol use: Not Currently   • Drug use: Not on file   • Sexual activity: Not on file   Other Topics Concern   • Not on file   Social History Narrative   • Not on file     Social Determinants of Health     Financial Resource Strain: Not on file   Food Insecurity: Not on file   Transportation Needs: Not on file   Physical Activity: Not on file   Stress: Not on file   Social Connections: Not on file   Intimate Partner Violence: Not on file   Housing Stability: Not on file       Allergies   Allergen Reactions   • Cat Hair Extract    • Morphine Other (See Comments)         Current Outpatient Medications:   •  Ascorbic Acid (VITAMIN C) 100 MG tablet, Take 1 tablet by mouth, Disp: , Rfl:   •  ASPIRIN 81 PO, Take 81 mg by mouth, Disp: , Rfl:   •  atorvastatin (LIPITOR) 20 mg tablet, , Disp: , Rfl:   •  chlorthalidone 25 mg tablet, TAKE ONE-HALF TABLET ONCE DAILY, Disp: , Rfl:   •  cholecalciferol (VITAMIN D3) 1,000 units tablet, Take 5 tablets by mouth, Disp: , Rfl:   •  Cholecalciferol 1.25 MG (30083 UT) TABS, Take 5,000 Units by mouth daily, Disp: , Rfl:   •  Continuous Blood Gluc Sensor (FreeStyle Trisha 2 Sensor) MISC, USE TO TEST BLOOD SUGARS 4 TIMES A DAY.  REPLACE EVERY 14 DAYS, Disp: , Rfl:   •  Cyanocobalamin 1000 MCG SUBL, Take 1 tablet by mouth, Disp: , Rfl:   •  insulin glargine (TOUJEO) 300 units/mL CONCENTRATED U-300 injection pen (1-unit dial), Takes 24 units daily, Disp: , Rfl:   •  Jardiance 10 MG TABS tablet, Take 10 mg by mouth every morning, Disp: , Rfl:   •  Krill Oil 500 MG CAPS, 1 capsule, Disp: , Rfl:   •  lisinopril (ZESTRIL) 10 mg tablet, Take 1 tablet by mouth daily, Disp: , Rfl:   •  Magnesium 200 MG TABS, 1 tablet, Disp: , Rfl:   •  NovoLOG FlexPen 100 units/mL injection pen, INJECT 3 UNITS UNDER THE SKIN 3 TIMES A DAY BEFORE MEALS, Disp: , Rfl:   •  Omega-3 Fatty Acids (Fish Oil) 1000 MG CPDR, Take 4,000 Units by mouth, Disp: , Rfl:   •  Ozempic, 2 MG/DOSE, 8 MG/3ML injection pen, INJECT 2 MG SUBCUTANEOUSLY ONE TIME PER WEEK, Disp: , Rfl:   •  Veltassa 8.4 g PACK, TAKE 1 PACKET BY MOUTH DAILY, Disp: , Rfl:   •  vitamin E 100 UNIT capsule, Take 100 Units by mouth daily, Disp: , Rfl:   •  Exenatide (Byetta 10 MCG Pen) 10 MCG/0.04ML SOPN, Inject under the skin (Patient not taking: Reported on 8/23/2023), Disp: , Rfl:   •  fluocinonide (LIDEX) 0.05 % ointment, Apply topically 2 (two) times a day (Patient not taking: Reported on 8/23/2023), Disp: 30 g, Rfl: 0  •  furosemide (LASIX) 20 mg tablet, Take 10 mg by mouth daily  (Patient not taking: Reported on 8/23/2023), Disp: , Rfl:   •  insulin aspart protamine-insulin aspart (NovoLOG 70/30) 100 units/mL injection, Inject under the skin (Patient not taking: Reported on 8/23/2023), Disp: , Rfl:   •  insulin glargine (TOUJEO MAX) 300 units/mL CONCETRATED U-300 injection pen (2-unit dial), as directed (Patient not taking: Reported on 8/23/2023), Disp: , Rfl:   •  liraglutide (VICTOZA) injection, Inject under the skin (Patient not taking: Reported on 8/23/2023), Disp: , Rfl:   •  Magnesium Gluconate 550 MG TABS, Take 30 mg by mouth daily (Patient not taking: Reported on 8/23/2023), Disp: , Rfl:   •  pantoprazole (PROTONIX) 40 mg tablet, Take 40 mg by mouth (Patient not taking: Reported on 8/23/2023), Disp: , Rfl:   •  simvastatin (ZOCOR) 20 mg tablet, Take 20 mg by mouth (Patient not taking: Reported on 5/19/2023), Disp: , Rfl:   I have spent a total time of 30 minutes on 08/23/23 in caring for this patient including Risks and benefits of tx options, Instructions for management, Patient and family education, Importance of tx compliance, Impressions, Documenting in the medical record, Reviewing / ordering tests, medicine, procedures   and Obtaining or reviewing history  .

## 2023-08-23 NOTE — PATIENT INSTRUCTIONS
-Complete lower extremity ultrasound and return to the office in 3 months for review. - Call the office if you experience any changes to your legs or feet such as new pain, redness or swelling.    - Stay active. Exercise everyday. Walking is the recommended exercise with a goal of 30 min 3-4 times a week. A healthy weight can assist in decreasing varicose vein symptoms.     - Wear Compression. Put them on in the morning, wear all day and take off before bed at night.     -Elevate your legs above the level of your heart. Elevate for 15 minutes 3-4 times a day. -When looking at buying compression, look for "gradient compression" with a weight 20-30mmHg (medium weight), knee high is fine.  -Try "[employer]"    -A good brand is Sigvaris, soft opaque, knee high.

## 2023-09-29 ENCOUNTER — HOSPITAL ENCOUNTER (OUTPATIENT)
Dept: NON INVASIVE DIAGNOSTICS | Facility: CLINIC | Age: 73
Discharge: HOME | End: 2023-09-29
Payer: COMMERCIAL

## 2023-09-29 DIAGNOSIS — R09.89 DECREASED PEDAL PULSES: ICD-10-CM

## 2023-09-29 PROCEDURE — 93925 LOWER EXTREMITY STUDY: CPT | Performed by: SURGERY

## 2023-09-29 PROCEDURE — 93923 UPR/LXTR ART STDY 3+ LVLS: CPT | Performed by: SURGERY

## 2023-09-29 PROCEDURE — 93923 UPR/LXTR ART STDY 3+ LVLS: CPT

## 2023-09-29 PROCEDURE — 93925 LOWER EXTREMITY STUDY: CPT

## 2023-12-08 ENCOUNTER — OFFICE VISIT (OUTPATIENT)
Dept: VASCULAR SURGERY | Facility: CLINIC | Age: 73
End: 2023-12-08
Payer: COMMERCIAL

## 2023-12-08 VITALS
DIASTOLIC BLOOD PRESSURE: 80 MMHG | HEIGHT: 71 IN | OXYGEN SATURATION: 96 % | HEART RATE: 95 BPM | WEIGHT: 226 LBS | BODY MASS INDEX: 31.64 KG/M2 | SYSTOLIC BLOOD PRESSURE: 122 MMHG

## 2023-12-08 DIAGNOSIS — I87.2 PERIPHERAL VENOUS INSUFFICIENCY: Primary | ICD-10-CM

## 2023-12-08 PROCEDURE — 99214 OFFICE O/P EST MOD 30 MIN: CPT | Performed by: NURSE PRACTITIONER

## 2023-12-08 RX ORDER — DIPHENOXYLATE HYDROCHLORIDE AND ATROPINE SULFATE 2.5; .025 MG/1; MG/1
1 TABLET ORAL DAILY
COMMUNITY

## 2023-12-08 NOTE — PROGRESS NOTES
Assessment/Plan:    Peripheral venous insufficiency  51-year-old male returns to the office for follow up with b/l leg swelling and compression use. Pt reports compliance with compression use and leg elevation since last office visit. States he was in [place] and just recently returned. Reports improvement in leg swelling and presents with no complaints.      -Pt has significant b/l venous stasis dermatitis on b/l ankles. B/L legs are warm and dry. He denies pain in the legs. -Denies claudication, true rest pain, wounds/ tissue loss. -Reports compliance with compression and leg elevation. Reports positive results with conservative measures.  -Pt noted to have decreased b/l pedal pulses with diffuse disease on study.  -Pt educated on pathophysiology of venous insufficiency and indications for intervention. Educated on conservative treatment at this time and discussed importance of b/l compression, elevation and increased walking and weight loss. Pt verbalized understanding and is agreeable to this plan. Recommendations   -Return to the office as needed  -Continue wearing compression. RX provided at prior visit, given education on how to use. -Leg elevation. Goal of 3-4 times a day 15 minutes at a time.  -Increase exercise and ambulation. Goal of 3-4 times a week for 30 min. -Apply moisturizing cream to the b/l legs to maintain skin integrity and prevent breakdown.  -Call the office with any new or worsening symptoms. Decreased pedal pulses  Reviewed LEAD from 9/29/23 which demonstrates no arterial occlusive disease bilaterally, diffuse disease bilaterally and non-compressible vessels. B/L GTP in healing range. -Reviewed LEAD with pt and educated on peripheral arterial disease. No indication for vascular intervention. All questions answered.    -Return to the office as needed.  -Call the office for any new or worsening symptoms such as leg swelling, discoloration, new wounds/ tissue loss.  -Continue to f/u with podiatry. There are no diagnoses linked to this encounter. Subjective:      Patient ID: Kaden Masters is a 68 y.o. male. Patient returns to office for 3 month follow up and review IDA done 9/29/2023. He reports improvement of swelling in bilateral lower extremities with the use of his compression and keeping skin hydrated. He also states he has been walking 8000 steps a day. He is taking ASA 81 mg and Atorvastatin. 79-year-old male with PMHx diabetes, pericarditis, obesity with BMI 32.66 kg/m, varicose veins in the office today as new referral by Mellissa Herbert, his podiatrist.    Pt denies any leg pain, denies claudication, denies true rest pain- has mild cramping in the morning occasionally, denies wounds/ tissue loss. Reports compliance with b/l leg compression and elevation. He reports the compression has dramatically improved his leg swelling and has several pairs. He has venous stasis dermatitis on b/l ankles with varicose veins. Denies having heaviness, soreness or pain in the veins. He states he puts Eucerin and neosporin on his legs every day. Currently taking aspirin 325 mg and atorvastatin daily. The following portions of the patient's history were reviewed and updated as appropriate: allergies, current medications, past family history, past medical history, past social history, past surgical history, and problem list.    Review of Systems   Constitutional: Negative. HENT: Negative. Eyes: Negative. Respiratory: Negative. Cardiovascular: Negative. Gastrointestinal: Negative. Endocrine: Negative. Genitourinary: Negative. Musculoskeletal: Negative. Skin:  Positive for color change (BLE). Allergic/Immunologic: Negative. Neurological: Negative. Hematological: Negative. Psychiatric/Behavioral: Negative.            Objective:      /80 (BP Location: Left arm, Patient Position: Sitting, Cuff Size: Standard) Pulse 95   Ht 5' 11" (1.803 m)   Wt 103 kg (226 lb)   SpO2 96%   BMI 31.52 kg/m²          Physical Exam  Vitals and nursing note reviewed. Constitutional:       Appearance: Normal appearance. He is obese. HENT:      Head: Normocephalic and atraumatic. Cardiovascular:      Rate and Rhythm: Normal rate and regular rhythm. Pulses:           Radial pulses are 1+ on the right side and 1+ on the left side. Dorsalis pedis pulses are 2+ on the right side and 2+ on the left side. Posterior tibial pulses are 0 on the right side and 0 on the left side. Heart sounds: Normal heart sounds. No murmur heard. Pulmonary:      Effort: Pulmonary effort is normal. No respiratory distress. Breath sounds: Normal breath sounds. Musculoskeletal:      Right lower le+ Edema present. Left lower le+ Edema present. Skin:     General: Skin is warm and dry. Capillary Refill: Capillary refill takes 2 to 3 seconds. Neurological:      General: No focal deficit present. Mental Status: He is alert and oriented to person, place, and time. Psychiatric:         Mood and Affect: Mood normal.         Behavior: Behavior normal.           I have reviewed and made appropriate changes to the review of systems input by the medical assistant. Vitals:    23 1559   BP: 122/80   BP Location: Left arm   Patient Position: Sitting   Cuff Size: Standard   Pulse: 95   SpO2: 96%   Weight: 103 kg (226 lb)   Height: 5' 11" (1.803 m)       Patient Active Problem List   Diagnosis    Peripheral venous insufficiency    Decreased pedal pulses       No past surgical history on file.     Family History   Problem Relation Age of Onset    Cancer Family     Diabetes Family     Heart disease Family     Hypertension Family        Social History     Socioeconomic History    Marital status: /Civil Union     Spouse name: Not on file    Number of children: Not on file    Years of education: Not on file Highest education level: Not on file   Occupational History    Not on file   Tobacco Use    Smoking status: Never    Smokeless tobacco: Never   Vaping Use    Vaping Use: Never used   Substance and Sexual Activity    Alcohol use: Not Currently    Drug use: Not on file    Sexual activity: Not on file   Other Topics Concern    Not on file   Social History Narrative    Not on file     Social Determinants of Health     Financial Resource Strain: Not on file   Food Insecurity: Not on file   Transportation Needs: Not on file   Physical Activity: Not on file   Stress: Not on file   Social Connections: Not on file   Intimate Partner Violence: Not on file   Housing Stability: Not on file       Allergies   Allergen Reactions    Cat Hair Extract     Morphine Other (See Comments)         Current Outpatient Medications:     Ascorbic Acid (VITAMIN C) 100 MG tablet, Take 1 tablet by mouth, Disp: , Rfl:     ASPIRIN 81 PO, Take 81 mg by mouth Taking 325 mg, Disp: , Rfl:     atorvastatin (LIPITOR) 20 mg tablet, , Disp: , Rfl:     chlorthalidone 25 mg tablet, TAKE ONE-HALF TABLET ONCE DAILY, Disp: , Rfl:     cholecalciferol (VITAMIN D3) 1,000 units tablet, Take 5 tablets by mouth, Disp: , Rfl:     Cholecalciferol 1.25 MG (78852 UT) TABS, Take 5,000 Units by mouth daily, Disp: , Rfl:     Continuous Blood Gluc Sensor (FreeStyle Trisha 2 Sensor) MISC, USE TO TEST BLOOD SUGARS 4 TIMES A DAY.  REPLACE EVERY 14 DAYS, Disp: , Rfl:     Cyanocobalamin 1000 MCG SUBL, Take 1 tablet by mouth, Disp: , Rfl:     insulin glargine (TOUJEO) 300 units/mL CONCENTRATED U-300 injection pen (1-unit dial), Takes 24 units daily, Disp: , Rfl:     Jardiance 10 MG TABS tablet, Take 10 mg by mouth every morning, Disp: , Rfl:     Krill Oil 500 MG CAPS, 1 capsule Takes 2 daily, Disp: , Rfl:     Magnesium 200 MG TABS, 1 tablet, Disp: , Rfl:     multivitamin (THERAGRAN) TABS, Take 1 tablet by mouth daily, Disp: , Rfl:     NovoLOG FlexPen 100 units/mL injection pen, INJECT 3 UNITS UNDER THE SKIN 3 TIMES A DAY BEFORE MEALS, Disp: , Rfl:     Omega-3 Fatty Acids (Fish Oil) 1000 MG CPDR, Take 4,000 Units by mouth, Disp: , Rfl:     Ozempic, 2 MG/DOSE, 8 MG/3ML injection pen, INJECT 2 MG SUBCUTANEOUSLY ONE TIME PER WEEK, Disp: , Rfl:     Veltassa 8.4 g PACK, Takes twice every three days, Disp: , Rfl:     vitamin E 100 UNIT capsule, Take 100 Units by mouth daily, Disp: , Rfl:     Exenatide (Byetta 10 MCG Pen) 10 MCG/0.04ML SOPN, Inject under the skin (Patient not taking: Reported on 8/23/2023), Disp: , Rfl:     fluocinonide (LIDEX) 0.05 % ointment, Apply topically 2 (two) times a day (Patient not taking: Reported on 8/23/2023), Disp: 30 g, Rfl: 0    furosemide (LASIX) 20 mg tablet, Take 10 mg by mouth daily  (Patient not taking: Reported on 8/23/2023), Disp: , Rfl:     insulin aspart protamine-insulin aspart (NovoLOG 70/30) 100 units/mL injection, Inject under the skin (Patient not taking: Reported on 8/23/2023), Disp: , Rfl:     insulin glargine (TOUJEO MAX) 300 units/mL CONCETRATED U-300 injection pen (2-unit dial), as directed (Patient not taking: Reported on 8/23/2023), Disp: , Rfl:     liraglutide (VICTOZA) injection, Inject under the skin (Patient not taking: Reported on 8/23/2023), Disp: , Rfl:     lisinopril (ZESTRIL) 10 mg tablet, Take 1 tablet by mouth daily (Patient not taking: Reported on 12/8/2023), Disp: , Rfl:     Magnesium Gluconate 550 MG TABS, Take 30 mg by mouth daily (Patient not taking: Reported on 8/23/2023), Disp: , Rfl:     pantoprazole (PROTONIX) 40 mg tablet, Take 40 mg by mouth (Patient not taking: Reported on 8/23/2023), Disp: , Rfl:     simvastatin (ZOCOR) 20 mg tablet, Take 20 mg by mouth (Patient not taking: Reported on 5/19/2023), Disp: , Rfl:   I have spent a total time of 30 minutes on 12/08/23 in caring for this patient including Diagnostic results, Risks and benefits of tx options, Instructions for management, Patient and family education, Counseling / Coordination of care, Documenting in the medical record, Reviewing / ordering tests, medicine, procedures  , and Obtaining or reviewing history  .

## 2023-12-08 NOTE — ASSESSMENT & PLAN NOTE
Reviewed LEAD from 9/29/23 which demonstrates no arterial occlusive disease bilaterally, diffuse disease bilaterally and non-compressible vessels. B/L GTP in healing range. -Reviewed LEAD with pt and educated on peripheral arterial disease. No indication for vascular intervention. All questions answered. -Return to the office as needed.  -Call the office for any new or worsening symptoms such as leg swelling, discoloration, new wounds/ tissue loss. -Continue to f/u with podiatry.

## 2023-12-08 NOTE — ASSESSMENT & PLAN NOTE
68-year-old male returns to the office for follow up with b/l leg swelling and compression use. Pt reports compliance with compression use and leg elevation since last office visit. States he was in [place] and just recently returned. Reports improvement in leg swelling and presents with no complaints.      -Pt has significant b/l venous stasis dermatitis on b/l ankles. B/L legs are warm and dry. He denies pain in the legs. -Denies claudication, true rest pain, wounds/ tissue loss. -Reports compliance with compression and leg elevation. Reports positive results with conservative measures.  -Pt noted to have decreased b/l pedal pulses with diffuse disease on study.  -Pt educated on pathophysiology of venous insufficiency and indications for intervention. Educated on conservative treatment at this time and discussed importance of b/l compression, elevation and increased walking and weight loss. Pt verbalized understanding and is agreeable to this plan. Recommendations   -Return to the office as needed  -Continue wearing compression. RX provided at prior visit, given education on how to use. -Leg elevation. Goal of 3-4 times a day 15 minutes at a time.  -Increase exercise and ambulation. Goal of 3-4 times a week for 30 min. -Apply moisturizing cream to the b/l legs to maintain skin integrity and prevent breakdown.  -Call the office with any new or worsening symptoms.

## 2023-12-08 NOTE — PATIENT INSTRUCTIONS
-Return to the office as needed     -Call the office if you experience any changes to your legs or feet such as new pain, redness or swelling.    - Stay active. Exercise everyday. Walking is the recommended exercise with a goal of 30 min 3-4 times a week. A healthy weight can assist in decreasing varicose vein symptoms.     - Wear Compression. Put them on in the morning, wear all day and take off before bed at night.     -Elevate your legs above the level of your heart. Elevate for 15 minutes 3-4 times a day. -When looking at buying compression, look for "gradient compression" with a weight 20-30mmHg (medium weight), knee high is fine.  -Try "BitePal"    -A good brand is Sigvaris, soft opaque, knee high.

## 2024-06-21 ENCOUNTER — TELEPHONE (OUTPATIENT)
Age: 74
End: 2024-06-21

## 2024-06-21 NOTE — TELEPHONE ENCOUNTER
Caller: [facility]    Doctor and/or Office: Dr. Wadsworth    #: [phone]    Escalation: Care Requesting patients last DFE be faxed to their office at [phone]. I did let her know this patient has not seen Dr. Wadsworth since 3-. Thank you

## 2024-08-06 ENCOUNTER — OFFICE VISIT (OUTPATIENT)
Dept: PHYSICAL THERAPY | Facility: CLINIC | Age: 74
End: 2024-08-06
Payer: COMMERCIAL

## 2024-08-06 DIAGNOSIS — M54.50 ACUTE RIGHT-SIDED LOW BACK PAIN WITHOUT SCIATICA: Primary | ICD-10-CM

## 2024-08-06 PROCEDURE — 97162 PT EVAL MOD COMPLEX 30 MIN: CPT | Performed by: PHYSICAL THERAPIST

## 2024-08-06 PROCEDURE — 97112 NEUROMUSCULAR REEDUCATION: CPT | Performed by: PHYSICAL THERAPIST

## 2024-08-06 NOTE — PROGRESS NOTES
PT Evaluation     Today's date: 2024  Patient name: Facundo Henson  : 1950  MRN: [id number]  Referring provider: Ronda Carver MD  Dx:   Encounter Diagnosis     ICD-10-CM    1. Acute right-sided low back pain without sciatica  M54.50           Start Time: 1010  Stop Time: 1050  Total time in clinic (min): 40 minutes    Assessment  Impairments: abnormal muscle firing, abnormal muscle tone, abnormal or restricted ROM, abnormal movement, activity intolerance, lacks appropriate home exercise program, pain with function, weight-bearing intolerance and activity limitations  Symptom irritability: low    Assessment details: 74 year old male patient reports to PT with acute R sided proximal LE pain. No red flags noted and patient denies numbness/tingling. Patient symptoms were significantly reduced since starting prescribed steroid taper, so objective testing limited. Patient has suspected directional preference for lumbar flexion based on subjective/objective findings. Patient also has significant B hip tightness. Patient will benefit from skilled PT services to address current impairments and functional limitations to help patient return to his PLOF.       Understanding of Dx/Px/POC: good     Prognosis: good    Goals  STG  1. Patient will be independent with completion of HEP throughout therapy  2. Patient will stand up without increase in difficulty in 2 weeks.  LTG  1. Patient will stand for prolonged periods without increase in symptoms so patient can complete more household tasks with less difficulty in 4 weeks.  2. Patient will ambulate for prolonged periods without increase in symptoms so patient can navigate throughout the community with less difficulty in 4 weeks.       Plan  Patient would benefit from: skilled physical therapy    Planned therapy interventions: abdominal trunk stabilization, activity modification, joint mobilization, manual therapy, motor coordination training, neuromuscular  "re-education, body mechanics training, patient/caregiver education, strengthening, stretching, therapeutic activities, therapeutic exercise, home exercise program, flexibility and functional ROM exercises    Frequency: 2x week  Duration in weeks: 4  Treatment plan discussed with: patient        Subjective Evaluation    History of Present Illness  Mechanism of injury: Patient reports about 2 months ago. Patient was at the grocery store and picked up cases of water without issue. Patient then went to kick the door and the door opened so his leg \"felt like it extended a little bit.\" Since then he had pain in his groin and his posterior and anterior leg. Patient was put on steroids which immediately helped his pain but it spiked his blood sugar a lot. He has since maintained his blood sugar and is weaning off the steroids. Patient notes most difficulty with the first few steps after sitting down. Patient oesn't have difficulty sleeping due to his symptoms.   Patient Goals  Patient goals for therapy: decreased pain, increased motion, increased strength and return to sport/leisure activities    Pain  Current pain ratin  At best pain ratin  At worst pain ratin  Quality: dull ache  Relieving factors: change in position and medications    Treatments  Current treatment: physical therapy        Objective     Neurological Testing     Sensation     Lumbar   Left   Intact: light touch    Right   Intact: light touch    Active Range of Motion     Lumbar   Flexion:  Restriction level: moderate  Extension:  Restriction level: maximal    Strength/Myotome Testing     Left Hip   Planes of Motion   Flexion: 4  Extension: 3+  Abduction: 3-    Right Hip   Planes of Motion   Flexion: 4  Extension: 3+  Abduction: 3-    Left Knee   Flexion: 4  Extension: 4    Right Knee   Flexion: 4  Extension: 4    Left Ankle/Foot   Dorsiflexion: 4    Right Ankle/Foot   Dorsiflexion: 4    Tests     Lumbar     Left   Negative crossed SLR, femoral " stretch and passive SLR.     Right   Negative crossed SLR, femoral stretch and passive SLR.     General Comments:      Hip Comments   Modified néstor test sidelying: positive B (lacking 5 deg hip extension)  Hamstring 90/90: 125 deg B             Precautions: Type II DM, peripheral neuropathy,       Manuals 8/6                                                                Neuro Re-Ed                                                                                                        Ther Ex             Seated lumbar flexion r            SKTC r                         bike             Supine hip flexor stretch             Supine hamstring stretch                                       Ther Activity                                       Gait Training                                       Modalities

## 2024-08-06 NOTE — LETTER
2024    Ronda Carver MD  [address]  [address]  [place] [address]    Patient: Facundo Henson   YOB: 1950   Date of Visit: 2024     Encounter Diagnosis     ICD-10-CM    1. Acute right-sided low back pain without sciatica  M54.50           Dear Dr. Carver:    Thank you for your recent referral of Facundo Henson. Please review the attached evaluation summary from Facundo's recent visit.     Please verify that you agree with the plan of care by signing the attached order.     If you have any questions or concerns, please do not hesitate to call.     I sincerely appreciate the opportunity to share in the care of one of your patients and hope to have another opportunity to work with you in the near future.       Sincerely,    Kei Hernadez, PT      Referring Provider:      I certify that I have read the below Plan of Care and certify the need for these services furnished under this plan of treatment while under my care.                    Ronda Carver MD  [address]  [place] [id number]  Via Fax: [phone]          PT Evaluation     Today's date: 2024  Patient name: Facundo Henson  : 1950  MRN: [id number]  Referring provider: Ronda Carver MD  Dx:   Encounter Diagnosis     ICD-10-CM    1. Acute right-sided low back pain without sciatica  M54.50           Start Time: 1010  Stop Time: 1050  Total time in clinic (min): 40 minutes    Assessment  Impairments: abnormal muscle firing, abnormal muscle tone, abnormal or restricted ROM, abnormal movement, activity intolerance, lacks appropriate home exercise program, pain with function, weight-bearing intolerance and activity limitations  Symptom irritability: low    Assessment details: 74 year old male patient reports to PT with acute R sided proximal LE pain. No red flags noted and patient denies numbness/tingling. Patient symptoms were significantly reduced since starting prescribed steroid taper, so objective  "testing limited. Patient has suspected directional preference for lumbar flexion based on subjective/objective findings. Patient also has significant B hip tightness. Patient will benefit from skilled PT services to address current impairments and functional limitations to help patient return to his PLOF.       Understanding of Dx/Px/POC: good     Prognosis: good    Goals  STG  1. Patient will be independent with completion of HEP throughout therapy  2. Patient will stand up without increase in difficulty in 2 weeks.  LTG  1. Patient will stand for prolonged periods without increase in symptoms so patient can complete more household tasks with less difficulty in 4 weeks.  2. Patient will ambulate for prolonged periods without increase in symptoms so patient can navigate throughout the community with less difficulty in 4 weeks.       Plan  Patient would benefit from: skilled physical therapy    Planned therapy interventions: abdominal trunk stabilization, activity modification, joint mobilization, manual therapy, motor coordination training, neuromuscular re-education, body mechanics training, patient/caregiver education, strengthening, stretching, therapeutic activities, therapeutic exercise, home exercise program, flexibility and functional ROM exercises    Frequency: 2x week  Duration in weeks: 4  Treatment plan discussed with: patient        Subjective Evaluation    History of Present Illness  Mechanism of injury: Patient reports about 2 months ago. Patient was at the grocery store and picked up cases of water without issue. Patient then went to kick the door and the door opened so his leg \"felt like it extended a little bit.\" Since then he had pain in his groin and his posterior and anterior leg. Patient was put on steroids which immediately helped his pain but it spiked his blood sugar a lot. He has since maintained his blood sugar and is weaning off the steroids. Patient notes most difficulty with the first " few steps after sitting down. Patient oesn't have difficulty sleeping due to his symptoms.   Patient Goals  Patient goals for therapy: decreased pain, increased motion, increased strength and return to sport/leisure activities    Pain  Current pain ratin  At best pain ratin  At worst pain ratin  Quality: dull ache  Relieving factors: change in position and medications    Treatments  Current treatment: physical therapy        Objective     Neurological Testing     Sensation     Lumbar   Left   Intact: light touch    Right   Intact: light touch    Active Range of Motion     Lumbar   Flexion:  Restriction level: moderate  Extension:  Restriction level: maximal    Strength/Myotome Testing     Left Hip   Planes of Motion   Flexion: 4  Extension: 3+  Abduction: 3-    Right Hip   Planes of Motion   Flexion: 4  Extension: 3+  Abduction: 3-    Left Knee   Flexion: 4  Extension: 4    Right Knee   Flexion: 4  Extension: 4    Left Ankle/Foot   Dorsiflexion: 4    Right Ankle/Foot   Dorsiflexion: 4    Tests     Lumbar     Left   Negative crossed SLR, femoral stretch and passive SLR.     Right   Negative crossed SLR, femoral stretch and passive SLR.     General Comments:      Hip Comments   Modified néstor test sidelying: positive B (lacking 5 deg hip extension)  Hamstring 90/90: 125 deg B             Precautions: Type II DM, peripheral neuropathy,       Manuals                                                                 Neuro Re-Ed                                                                                                        Ther Ex             Seated lumbar flexion r            SKTC r                         bike             Supine hip flexor stretch             Supine hamstring stretch                                       Ther Activity                                       Gait Training                                       Modalities

## 2024-08-13 ENCOUNTER — OFFICE VISIT (OUTPATIENT)
Dept: PHYSICAL THERAPY | Facility: CLINIC | Age: 74
End: 2024-08-13
Payer: COMMERCIAL

## 2024-08-13 DIAGNOSIS — M54.50 ACUTE RIGHT-SIDED LOW BACK PAIN WITHOUT SCIATICA: Primary | ICD-10-CM

## 2024-08-13 PROCEDURE — 97110 THERAPEUTIC EXERCISES: CPT | Performed by: PHYSICAL THERAPIST

## 2024-08-13 PROCEDURE — 97112 NEUROMUSCULAR REEDUCATION: CPT | Performed by: PHYSICAL THERAPIST

## 2024-08-13 NOTE — PROGRESS NOTES
"Daily Note     Today's date: 2024  Patient name: Facundo Henson  : 1950  MRN: [id number]  Referring provider: Ronda Carver MD  Dx:   Encounter Diagnosis     ICD-10-CM    1. Acute right-sided low back pain without sciatica  M54.50                      Subjective: Patient reports last day of using steroids as is down to half a dose. Notes walking without cane. Please with progress.      Objective: See treatment diary below      Assessment: Tolerated treatment well. Patient would benefit from continued PT. Treatment plan initiated. Progressing well with current POC.      Plan: Progress treatment as tolerated.       Precautions: Type II DM, peripheral neuropathy,       Manuals            DKTC  MK           R hip stretch/PROM  SKTC/piriformis MK                                     Neuro Re-Ed                                                                                                        Ther Ex             Seated lumbar flexion r 10x 5\" holds           SKTC r                         bike  5 min lvl 1           Supine hip flexor stretch             Supine hamstring stretch             Leg press  3x10 115 lbs                        Ther Activity                                       Gait Training                                       Modalities                                            "

## 2024-08-15 ENCOUNTER — OFFICE VISIT (OUTPATIENT)
Dept: PHYSICAL THERAPY | Facility: CLINIC | Age: 74
End: 2024-08-15
Payer: COMMERCIAL

## 2024-08-15 DIAGNOSIS — M54.50 ACUTE RIGHT-SIDED LOW BACK PAIN WITHOUT SCIATICA: Primary | ICD-10-CM

## 2024-08-15 PROCEDURE — 97112 NEUROMUSCULAR REEDUCATION: CPT

## 2024-08-15 PROCEDURE — 97110 THERAPEUTIC EXERCISES: CPT

## 2024-08-15 PROCEDURE — 97140 MANUAL THERAPY 1/> REGIONS: CPT

## 2024-08-15 NOTE — PROGRESS NOTES
"Daily Note     Today's date: 8/15/2024  Patient name: Facundo Henson  : 1950  MRN: [id number]  Referring provider: Ronda Carver MD  Dx:   Encounter Diagnosis     ICD-10-CM    1. Acute right-sided low back pain without sciatica  M54.50                      Subjective: Significant improvement in LBP since IE. Tight HS and piriformis noted bilaterally.    Objective: See treatment diary below     Precautions: Type II DM, peripheral neuropathy,     Manuals 8/6 8/13 8/15          DKTC  MK           R hip stretch/PROM  SKTC/  piriformis MK SH & glute TPR                                    Neuro Re-Ed   8/15          sidestepping   GTB 3 laps                                    Ther Ex   8/15          Seated lumbar flexion r 10x 5\" holds 10\"x10 fw  10\"x5 to L          SKTC r            Supine piriformis stretch   30\"x3 R          bike  5 min lvl 1 5' L3          Supine hip flexor stretch             Supine hamstring stretch   90/90  20\"x5 ea          Leg press  3x10 115 lbs 125# 3x10                                                 Modalities                                          Assessment: Tolerated treatment well. Patient demonstrated fatigue post treatment, exhibited good technique with therapeutic exercises, and would benefit from continued PT.  Issued updated HEP to address tight HS and piriformis muscles.    Plan: Pt is in [place] for the next 2 weeks. Continue per plan of care.  Progress treatment as tolerated.     Lina Winston    "

## 2024-08-20 ENCOUNTER — APPOINTMENT (OUTPATIENT)
Dept: PHYSICAL THERAPY | Facility: CLINIC | Age: 74
End: 2024-08-20
Payer: COMMERCIAL

## 2024-08-22 ENCOUNTER — APPOINTMENT (OUTPATIENT)
Dept: PHYSICAL THERAPY | Facility: CLINIC | Age: 74
End: 2024-08-22
Payer: COMMERCIAL

## 2024-08-27 ENCOUNTER — APPOINTMENT (OUTPATIENT)
Dept: PHYSICAL THERAPY | Facility: CLINIC | Age: 74
End: 2024-08-27
Payer: COMMERCIAL

## 2024-08-29 ENCOUNTER — APPOINTMENT (OUTPATIENT)
Dept: PHYSICAL THERAPY | Facility: CLINIC | Age: 74
End: 2024-08-29
Payer: COMMERCIAL

## 2024-09-03 ENCOUNTER — OFFICE VISIT (OUTPATIENT)
Dept: PHYSICAL THERAPY | Facility: CLINIC | Age: 74
End: 2024-09-03
Payer: COMMERCIAL

## 2024-09-03 DIAGNOSIS — M54.50 ACUTE RIGHT-SIDED LOW BACK PAIN WITHOUT SCIATICA: Primary | ICD-10-CM

## 2024-09-03 PROCEDURE — 97140 MANUAL THERAPY 1/> REGIONS: CPT

## 2024-09-03 PROCEDURE — 97112 NEUROMUSCULAR REEDUCATION: CPT

## 2024-09-03 PROCEDURE — 97110 THERAPEUTIC EXERCISES: CPT

## 2024-09-03 NOTE — PROGRESS NOTES
"Daily Note     Today's date: 9/3/2024  Patient name: Facundo Henson  : 1950  MRN: [id number]  Referring provider: Ronda Carver MD  Dx:   Encounter Diagnosis     ICD-10-CM    1. Acute right-sided low back pain without sciatica  M54.50                      Subjective: Pt was in [place] for the past 2 weeks and had moderate back pain towards the end of his trip from increased activity. TTP R QL and glute during manuals; pt notes soreness but overall pian improvement after session.    Objective: See treatment diary below     Precautions: Type II DM, peripheral neuropathy,     Manuals 8/6 8/13 8/15 9/3         DKTC  MK           R hip stretch/PROM  SKTC/  piriformis MK SH & glute TPR SH & glute TPR                                   Neuro Re-Ed   8/15 9/3         sidestepping   GTB 3 laps GTB 3 laps                                   Ther Ex   8/15 9/3         Seated lumbar flexion r 10x 5\" holds 10\"x10 fw  10\"x5 to L 10\"x5 ea fw & to L         SKTC r            Supine piriformis stretch   30\"x3 R 30\"x3 R         bike  5 min lvl 1 5' L3          Supine hip flexor stretch             Supine hamstring stretch   90/90  20\"x5 ea 90/90  30\"x3 ea         Leg press  3x10 115 lbs 125# 3x10 125# 3x10                                                Modalities    9/3         MHP    10' pre                         Assessment: Tolerated treatment well. Patient demonstrated fatigue post treatment, exhibited good technique with therapeutic exercises, and would benefit from continued PT    Plan: Continue per plan of care.  Progress treatment as tolerated.     Lina Winston    "

## 2024-09-10 ENCOUNTER — OFFICE VISIT (OUTPATIENT)
Dept: PHYSICAL THERAPY | Facility: CLINIC | Age: 74
End: 2024-09-10
Payer: COMMERCIAL

## 2024-09-10 DIAGNOSIS — M54.50 ACUTE RIGHT-SIDED LOW BACK PAIN WITHOUT SCIATICA: Primary | ICD-10-CM

## 2024-09-10 PROCEDURE — 97140 MANUAL THERAPY 1/> REGIONS: CPT | Performed by: PHYSICAL THERAPIST

## 2024-09-10 PROCEDURE — 97112 NEUROMUSCULAR REEDUCATION: CPT | Performed by: PHYSICAL THERAPIST

## 2024-09-10 PROCEDURE — 97110 THERAPEUTIC EXERCISES: CPT | Performed by: PHYSICAL THERAPIST

## 2024-09-10 NOTE — PROGRESS NOTES
"Daily Note     Today's date: 9/10/2024  Patient name: Facundo Henson  : 1950  MRN: [id number]  Referring provider: Ronda Carver MD  Dx:   Encounter Diagnosis     ICD-10-CM    1. Acute right-sided low back pain without sciatica  M54.50                      Subjective: Patient reports walking better and only taking 1 Tylenol in the morning.    Objective: See treatment diary below     Precautions: Type II DM, peripheral neuropathy,     Manuals 8/6 8/13 8/15 9/3 9/10        DKTC  MK           R hip stretch/PROM  SKTC/  piriformis MK SH & glute TPR SH & glute TPR MK                                  Neuro Re-Ed   8/15 9/3         sidestepping   GTB 3 laps GTB 3 laps 3 laps grn                                  Ther Ex   8/15 9/3         Seated lumbar flexion r 10x 5\" holds 10\"x10 fw  10\"x5 to L 10\"x5 ea fw & to L 10x 10\" holds fwd         SKTC r            Supine piriformis stretch   30\"x3 R 30\"x3 R         bike  5 min lvl 1 5' L3          Supine hip flexor stretch             Supine hamstring stretch   90/90  20\"x5 ea 90/90  30\"x3 ea         Leg press  3x10 115 lbs 125# 3x10 125# 3x10 3x12 125 lbs                                               Modalities    9/3         MHP    10' pre                         Assessment: Tolerated treatment well. Patient demonstrated fatigue post treatment, exhibited good technique with therapeutic exercises, and would benefit from continued PT    Plan: Continue per plan of care.  Progress treatment as tolerated.     Kei Hernadez    "

## 2024-09-17 ENCOUNTER — OFFICE VISIT (OUTPATIENT)
Dept: PHYSICAL THERAPY | Facility: CLINIC | Age: 74
End: 2024-09-17
Payer: COMMERCIAL

## 2024-09-17 DIAGNOSIS — M54.50 ACUTE RIGHT-SIDED LOW BACK PAIN WITHOUT SCIATICA: Primary | ICD-10-CM

## 2024-09-17 PROCEDURE — 97110 THERAPEUTIC EXERCISES: CPT | Performed by: PHYSICAL THERAPIST

## 2024-09-17 PROCEDURE — 97140 MANUAL THERAPY 1/> REGIONS: CPT | Performed by: PHYSICAL THERAPIST

## 2024-09-17 PROCEDURE — 97112 NEUROMUSCULAR REEDUCATION: CPT | Performed by: PHYSICAL THERAPIST

## 2024-09-17 NOTE — PROGRESS NOTES
"Daily Note     Today's date: 2024  Patient name: Facundo Henson  : 1950  MRN: [id number]  Referring provider: Ronda Carver MD  Dx:   Encounter Diagnosis     ICD-10-CM    1. Acute right-sided low back pain without sciatica  M54.50                      Subjective: Patient reports still only having to take 1 tylenol and notes continued improvement.    Objective: See treatment diary below     Precautions: Type II DM, peripheral neuropathy,     Manuals 8/6 8/15 9/3 9/10 9/17       DKTC            R hip stretch/PROM  SH & glute TPR SH & glute TPR MK MK                               Neuro Re-Ed  8/15 9/3         sidestepping  GTB 3 laps GTB 3 laps 3 laps grn 3 laps grn                               Ther Ex  8/15 9/3         Seated lumbar flexion r 10\"x10 fw  10\"x5 to L 10\"x5 ea fw & to L 10x 10\" holds fwd  10x 10\" holds fwd and        SKTC r           Supine piriformis stretch  30\"x3 R 30\"x3 R         bike  5' L3          Supine hip flexor stretch            Supine hamstring stretch  90/90  20\"x5 ea 90/90  30\"x3 ea         Leg press  125# 3x10 125# 3x10 3x12 125 lbs 3x12 145 lbs                                           Modalities   9/3         MHP   10' pre                        Assessment: Tolerated treatment well. Patient demonstrated fatigue post treatment, exhibited good technique with therapeutic exercises, and would benefit from continued PT    Plan: Continue per plan of care.  Progress treatment as tolerated.     Kei Hernadez    "

## 2024-09-24 ENCOUNTER — OFFICE VISIT (OUTPATIENT)
Dept: PHYSICAL THERAPY | Facility: CLINIC | Age: 74
End: 2024-09-24
Payer: COMMERCIAL

## 2024-09-24 DIAGNOSIS — M54.50 ACUTE RIGHT-SIDED LOW BACK PAIN WITHOUT SCIATICA: Primary | ICD-10-CM

## 2024-09-24 PROCEDURE — 97110 THERAPEUTIC EXERCISES: CPT | Performed by: PHYSICAL THERAPIST

## 2024-09-24 PROCEDURE — 97112 NEUROMUSCULAR REEDUCATION: CPT | Performed by: PHYSICAL THERAPIST

## 2024-09-24 NOTE — PROGRESS NOTES
"Daily Note     Today's date: 2024  Patient name: Facundo Henson  : 1950  MRN: [id number]  Referring provider: Ronda Carver MD  Dx:   Encounter Diagnosis     ICD-10-CM    1. Acute right-sided low back pain without sciatica  M54.50                      Subjective: Patient reports still only having to take 1 tylenol and notes continued improvement.    Objective: See treatment diary below     Precautions: Type II DM, peripheral neuropathy,     Manuals 9/10 9/17 9/24      DKTC         R hip stretch/PROM MK MK MK                        Neuro Re-Ed         sidestepping 3 laps grn 3 laps grn 2 laps 2 ways each grn                        Ther Ex         Seated lumbar flexion 10x 10\" holds fwd  10x 10\" holds fwd and  10x 10\" holds fwd and L      SKTC         Supine piriformis stretch         bike         Supine hip flexor stretch         Supine hamstring stretch         Leg press 3x12 125 lbs 3x12 145 lbs 3x10 155 lbs                                 Modalities         MHP                     Assessment: Tolerated treatment well. Patient demonstrated fatigue post treatment, exhibited good technique with therapeutic exercises, and would benefit from continued PT    Plan: Continue per plan of care.  Progress treatment as tolerated.     Kei Hernadez    "

## 2024-10-01 ENCOUNTER — OFFICE VISIT (OUTPATIENT)
Dept: PHYSICAL THERAPY | Facility: CLINIC | Age: 74
End: 2024-10-01
Payer: COMMERCIAL

## 2024-10-01 DIAGNOSIS — M54.50 ACUTE RIGHT-SIDED LOW BACK PAIN WITHOUT SCIATICA: Primary | ICD-10-CM

## 2024-10-01 PROCEDURE — 97110 THERAPEUTIC EXERCISES: CPT | Performed by: PHYSICAL THERAPIST

## 2024-10-01 PROCEDURE — 97112 NEUROMUSCULAR REEDUCATION: CPT | Performed by: PHYSICAL THERAPIST

## 2024-10-01 NOTE — PROGRESS NOTES
"Daily Note     Today's date: 10/1/2024  Patient name: Facundo Henson  : 1950  MRN: [id number]  Referring provider: Ronda Carver MD  Dx:   Encounter Diagnosis     ICD-10-CM    1. Acute right-sided low back pain without sciatica  M54.50                      Subjective: Patient reports 1% pain.    Objective: See treatment diary below     Precautions: Type II DM, peripheral neuropathy,     Manuals 9/10 9/17 9/24 10/1     DKTC         R hip stretch/PROM MK MK MK MK                       Neuro Re-Ed         sidestepping 3 laps grn 3 laps grn 2 laps 2 ways each grn 2 laps 2 ways each grn                        Ther Ex         Seated lumbar flexion 10x 10\" holds fwd  10x 10\" holds fwd and  10x 10\" holds fwd and L 10x 10\" holds fwd and L     SKTC         Supine piriformis stretch         bike         Supine hip flexor stretch         Supine hamstring stretch         Leg press 3x12 125 lbs 3x12 145 lbs 3x10 155 lbs 3x10 185 lbs                                Modalities         MHP                     Assessment: Tolerated treatment well. Patient demonstrated fatigue post treatment, exhibited good technique with therapeutic exercises, and would benefit from continued PT    Plan: Continue per plan of care.  Progress treatment as tolerated.     Kei Hernadez    "

## 2024-10-08 ENCOUNTER — OFFICE VISIT (OUTPATIENT)
Dept: PHYSICAL THERAPY | Facility: CLINIC | Age: 74
End: 2024-10-08
Payer: COMMERCIAL

## 2024-10-08 DIAGNOSIS — M54.50 ACUTE RIGHT-SIDED LOW BACK PAIN WITHOUT SCIATICA: Primary | ICD-10-CM

## 2024-10-08 PROCEDURE — 97112 NEUROMUSCULAR REEDUCATION: CPT | Performed by: PHYSICAL THERAPIST

## 2024-10-08 PROCEDURE — 97110 THERAPEUTIC EXERCISES: CPT | Performed by: PHYSICAL THERAPIST

## 2024-10-08 NOTE — PROGRESS NOTES
"Daily Note     Today's date: 10/8/2024  Patient name: Facundo Henson  : 1950  MRN: [id number]  Referring provider: Ronda Carver MD  Dx:   Encounter Diagnosis     ICD-10-CM    1. Acute right-sided low back pain without sciatica  M54.50                      Subjective: Patient reports danced at wedding and was able to do it.    Objective: See treatment diary below     Precautions: Type II DM, peripheral neuropathy,     Manuals 9/10 9/17 9/24 10/1 10/8    DKTC         R hip stretch/PROM MK MK MK MK MK                      Neuro Re-Ed         sidestepping 3 laps grn 3 laps grn 2 laps 2 ways each grn 2 laps 2 ways each grn  2 laps 2 ways each blue     Standing band anti-rotations     20x 8 lbs B              Ther Ex         Seated lumbar flexion 10x 10\" holds fwd  10x 10\" holds fwd and  10x 10\" holds fwd and L 10x 10\" holds fwd and L 10x 10\" holds fwd and L     SKTC         Supine piriformis stretch         bike         Supine hip flexor stretch         Supine hamstring stretch         Leg press 3x12 125 lbs 3x12 145 lbs 3x10 155 lbs 3x10 185 lbs 3x10 205 lbs                                Modalities         MHP                     Assessment: Tolerated treatment well. Patient demonstrated fatigue post treatment, exhibited good technique with therapeutic exercises, and would benefit from continued PT    Plan: Continue per plan of care.  Progress treatment as tolerated.     Kei Hernadez    "

## 2024-10-15 ENCOUNTER — OFFICE VISIT (OUTPATIENT)
Dept: PHYSICAL THERAPY | Facility: CLINIC | Age: 74
End: 2024-10-15
Payer: COMMERCIAL

## 2024-10-15 DIAGNOSIS — M54.50 ACUTE RIGHT-SIDED LOW BACK PAIN WITHOUT SCIATICA: Primary | ICD-10-CM

## 2024-10-15 PROCEDURE — 97110 THERAPEUTIC EXERCISES: CPT | Performed by: PHYSICAL THERAPIST

## 2024-10-15 PROCEDURE — 97112 NEUROMUSCULAR REEDUCATION: CPT | Performed by: PHYSICAL THERAPIST

## 2024-10-15 NOTE — PROGRESS NOTES
"Daily Note     Today's date: 10/15/2024  Patient name: Facundo Henson  : 1950  MRN: [id number]  Referring provider: Ronda Carver MD  Dx:   Encounter Diagnosis     ICD-10-CM    1. Acute right-sided low back pain without sciatica  M54.50                      Subjective: Patient reports played doubles pinComic Replyg without issue.    Objective: See treatment diary below     Precautions: Type II DM, peripheral neuropathy,     Manuals 9/17 9/24 10/1 10/8 10/15   DKTC        R hip stretch/PROM MK MK MK MK MK                   Neuro Re-Ed        sidestepping 3 laps grn 2 laps 2 ways each grn 2 laps 2 ways each grn  2 laps 2 ways each blue  2 laps 2 ways each blue   Standing band anti-rotations    20x 8 lbs B     SLS w/ band hip abd     2x12 blue    Ther Ex        Seated lumbar flexion 10x 10\" holds fwd and  10x 10\" holds fwd and L 10x 10\" holds fwd and L 10x 10\" holds fwd and L  10x 10\" holds fwd and L   SKTC        Supine piriformis stretch        bike        Supine hip flexor stretch        Supine hamstring stretch        Leg press 3x12 145 lbs 3x10 155 lbs 3x10 185 lbs 3x10 205 lbs  3x10 205 lbs                           Modalities        MHP                   Assessment: Tolerated treatment well. Patient demonstrated fatigue post treatment, exhibited good technique with therapeutic exercises, and would benefit from continued PT    Plan: Continue per plan of care.  Progress treatment as tolerated.     Kei Hernadez    "

## 2024-10-22 ENCOUNTER — APPOINTMENT (OUTPATIENT)
Dept: PHYSICAL THERAPY | Facility: CLINIC | Age: 74
End: 2024-10-22
Payer: COMMERCIAL

## 2024-10-29 ENCOUNTER — APPOINTMENT (OUTPATIENT)
Dept: PHYSICAL THERAPY | Facility: CLINIC | Age: 74
End: 2024-10-29
Payer: COMMERCIAL

## 2024-11-05 ENCOUNTER — OFFICE VISIT (OUTPATIENT)
Dept: PHYSICAL THERAPY | Facility: CLINIC | Age: 74
End: 2024-11-05
Payer: COMMERCIAL

## 2024-11-05 DIAGNOSIS — M54.50 ACUTE RIGHT-SIDED LOW BACK PAIN WITHOUT SCIATICA: Primary | ICD-10-CM

## 2024-11-05 PROCEDURE — 97140 MANUAL THERAPY 1/> REGIONS: CPT

## 2024-11-05 PROCEDURE — 97112 NEUROMUSCULAR REEDUCATION: CPT

## 2024-11-05 PROCEDURE — 97110 THERAPEUTIC EXERCISES: CPT

## 2024-11-05 NOTE — PROGRESS NOTES
"Daily Note     Today's date: 2024  Patient name: Facundo Henson  : 1950  MRN: [id number]  Referring provider: Ronda Carver MD  Dx:   Encounter Diagnosis     ICD-10-CM    1. Acute right-sided low back pain without sciatica  M54.50                    Subjective: R hip feels better but he is TTP with ITB manual work.    Objective: See treatment diary below     Precautions: Type II DM, peripheral neuropathy,     Manuals 9/24 10/1 10/8 10/15 11/5    DKTC         R hip stretch/PROM MK MK MK MK SH                      Neuro Re-Ed         sidestepping 2 laps 2 ways each grn 2 laps 2 ways each grn  2 laps 2 ways each blue  2 laps 2 ways each blue Sidestepping 3 laps blue    Standing band anti-rotations   20x 8 lbs B       SLS w/ band hip abd    2x12 blue      Ther Ex         Seated lumbar flexion 10x 10\" holds fwd and L 10x 10\" holds fwd and L 10x 10\" holds fwd and L  10x 10\" holds fwd and L 10\"x10 ea fw and to L    SKTC         Supine piriformis stretch     Seated 30\"x3 R    bike     5' L3    Supine hip flexor stretch         Supine hamstring stretch         Leg press 3x10 155 lbs 3x10 185 lbs 3x10 205 lbs  3x10 205 lbs 225# 3x10                               Modalities         MHP                     Assessment: Tolerated treatment well. R hip very tight with piriformis stretch. Patient demonstrated fatigue post treatment, exhibited good technique with therapeutic exercises, and would benefit from continued PT    Plan: Continue per plan of care.  Progress treatment as tolerated.    Lina Winston    "

## 2024-11-12 ENCOUNTER — OFFICE VISIT (OUTPATIENT)
Dept: PHYSICAL THERAPY | Facility: CLINIC | Age: 74
End: 2024-11-12
Payer: COMMERCIAL

## 2024-11-12 DIAGNOSIS — M54.50 ACUTE RIGHT-SIDED LOW BACK PAIN WITHOUT SCIATICA: Primary | ICD-10-CM

## 2024-11-12 PROCEDURE — 97140 MANUAL THERAPY 1/> REGIONS: CPT

## 2024-11-12 PROCEDURE — 97110 THERAPEUTIC EXERCISES: CPT

## 2024-11-12 NOTE — PROGRESS NOTES
"Discharge Note     Today's date: 2024  Patient name: Facundo Henson  : 1950  MRN: [id number]  Referring provider: Ronda Carver MD  Dx:   Encounter Diagnosis     ICD-10-CM    1. Acute right-sided low back pain without sciatica  M54.50                      Subjective: Patient offers on new complaints today other than being late because the woman took his credit card. \"You guys cured me\".       Objective: See treatment diary below    Active Range of Motion     Lumbar   Flexion:  Restriction level: moderate  Extension:  Restriction level: maximal    Strength/Myotome Testing     Left Hip   Planes of Motion   Flexion: 4+  Extension: 4  Abduction: 4    Right Hip   Planes of Motion   Flexion: 4  Extension: 4  Abduction: 4    Left Knee   Flexion: 4+  Extension: 4+    Right Knee   Flexion: 4+  Extension: 4+    Left Ankle/Foot   Dorsiflexion: 4+    Right Ankle/Foot   Dorsiflexion: 4+    Tests     Lumbar     Left   Negative crossed SLR, femoral stretch and passive SLR.     Right   Negative crossed SLR, femoral stretch and passive SLR.     General Comments:      Hip Comments   Modified néstor test sidelying: positive B (lacking 5 deg hip extension)  Hamstring 90/90: 125 deg B        Assessment: Facundo Henson has been seen for 12 sessions. They have improved with mobility, pain, and strength, with ability to manage symptoms with HEP.Patient has also made progress towards short and long-term goals. At this time, patient is appropriate and agreeable to discharge. Patient provided with updated HEP and advised to call with any questions/concerns, verbalizes understanding.      Goals - all met   STG  1. Patient will be independent with completion of HEP throughout therapy  2. Patient will stand up without increase in difficulty in 2 weeks.  LTG  1. Patient will stand for prolonged periods without increase in symptoms so patient can complete more household tasks with less difficulty in 4 weeks.  2. Patient will ambulate " "for prolonged periods without increase in symptoms so patient can navigate throughout the community with less difficulty in 4 weeks.       Plan: Discharge POC.      Precautions: Type II DM, peripheral neuropathy,     Manuals 9/24 10/1 10/8 10/15 11/5 11/12   DKTC         R hip stretch/PROM MK MK MK MK SH CS                     Neuro Re-Ed     11/5    sidestepping 2 laps 2 ways each grn 2 laps 2 ways each grn  2 laps 2 ways each blue  2 laps 2 ways each blue Sidestepping 3 laps blue 3 laps blue    Standing band anti-rotations   20x 8 lbs B       SLS w/ band hip abd    2x12 blue      Ther Ex     11/5    Seated lumbar flexion 10x 10\" holds fwd and L 10x 10\" holds fwd and L 10x 10\" holds fwd and L  10x 10\" holds fwd and L 10\"x10 ea fw and to L 10x10\" ea fw and to L    SKTC         Supine piriformis stretch     Seated 30\"x3 R Seated 3x30\"    bike     5' L3 5' L3   Supine hip flexor stretch         Supine hamstring stretch         Leg press 3x10 155 lbs 3x10 185 lbs 3x10 205 lbs  3x10 205 lbs 225# 3x10 245# 3x10 plate 5, back 4                               Modalities         MHP                         "

## 2024-12-03 ENCOUNTER — EVALUATION (OUTPATIENT)
Dept: PHYSICAL THERAPY | Facility: CLINIC | Age: 74
End: 2024-12-03
Payer: COMMERCIAL

## 2024-12-03 DIAGNOSIS — M54.50 ACUTE RIGHT-SIDED LOW BACK PAIN WITHOUT SCIATICA: Primary | ICD-10-CM

## 2024-12-03 PROCEDURE — 97140 MANUAL THERAPY 1/> REGIONS: CPT | Performed by: PHYSICAL THERAPIST

## 2024-12-03 PROCEDURE — 97112 NEUROMUSCULAR REEDUCATION: CPT | Performed by: PHYSICAL THERAPIST

## 2024-12-03 PROCEDURE — 97110 THERAPEUTIC EXERCISES: CPT | Performed by: PHYSICAL THERAPIST

## 2024-12-03 NOTE — PROGRESS NOTES
Daily Note     Today's date: 12/3/2024  Patient name: Facundo Henson  : 1950  MRN: [id number]  Referring provider: Ronda Carver MD  Dx:   Encounter Diagnosis     ICD-10-CM    1. Acute right-sided low back pain without sciatica  M54.50                      Subjective: Patient reports with increase in low back pain since discontinuing PT. Notes difficulty walking and standing for prolonged periods.      Objective: See treatment diary below    Pain  Current pain ratin  At best pain ratin  At worst pain ratin    Neurological Testing      Sensation      Lumbar   Left   Intact: light touch     Right   Intact: light touch     Active Range of Motion      Lumbar   Flexion:  Restriction level: moderate  Extension:  Restriction level: maximal     Strength/Myotome Testing      Left Hip   Planes of Motion   Flexion: 4  Extension: 3+  Abduction: 3-     Right Hip   Planes of Motion   Flexion: 4  Extension: 3+  Abduction: 3-     Left Knee   Flexion: 4  Extension: 4     Right Knee   Flexion: 4  Extension: 4     Left Ankle/Foot   Dorsiflexion: 4     Right Ankle/Foot   Dorsiflexion: 4     Tests      Lumbar      Left   Negative crossed SLR, femoral stretch and passive SLR.      Right   Negative crossed SLR, femoral stretch and passive SLR.      General Comments:        Hip Comments   Modified néstor test sidelying: positive B (lacking 5 deg hip extension)  Hamstring 90/90: 125 deg B    Assessment: Tolerated treatment well. Patient would benefit from continued PT.    Patient is being re-evaluated after having period of time off since last PT session and is having increase in right sided low back pain. No red flags noted and patient denies numbness/tingling. Patient presents with decreased lumbar mobility, decreased motor coordination of core musculature, and suspected directional preference based on subjective/objective findings. Due to these impairments, patient is still limited functionally with prolonged standing  "and walking, which is affecting is ability to complete his daily house and work tasks. Patient will continue to benefit from skilled PT services to address current impairments and functional limitations to help patient return to his PLOF.     Understanding of Dx/Px/POC: good     Prognosis: good     Goals  STG  1. Patient will be independent with completion of HEP throughout therapy - NOT MET  2. Patient will stand up without increase in difficulty in 2 weeks. - MET  LTG  1. Patient will stand for prolonged periods without increase in symptoms so patient can complete more household tasks with less difficulty in 4 weeks. - NOT MET  2. Patient will ambulate for prolonged periods without increase in symptoms so patient can navigate throughout the community with less difficulty in 4 weeks. - NOT MET        Plan  Patient would benefit from: skilled physical therapy     Planned therapy interventions: abdominal trunk stabilization, activity modification, joint mobilization, manual therapy, motor coordination training, neuromuscular re-education, body mechanics training, patient/caregiver education, strengthening, stretching, therapeutic activities, therapeutic exercise, home exercise program, flexibility and functional ROM exercises     Frequency: 1x week  Duration in weeks: 8  Treatment plan discussed with: patient      Plan: Progress treatment as tolerated.       Precautions: Type II DM, peripheral neuropathy,     Manuals 10/8 10/15 11/5 11/12 12/3   DKTC        R hip stretch/PROM MK MK SH CS MK                   Neuro Re-Ed   11/5     sidestepping 2 laps 2 ways each blue  2 laps 2 ways each blue Sidestepping 3 laps blue 3 laps blue  3 laps blue   Standing band anti-rotations 20x 8 lbs B        SLS w/ band hip abd  2x12 blue       Ther Ex   11/5     Seated lumbar flexion 10x 10\" holds fwd and L  10x 10\" holds fwd and L 10\"x10 ea fw and to L 10x10\" ea fw and to L  10x 5\" holds fwd and to l    SKTC        Supine piriformis " "stretch   Seated 30\"x3 R Seated 3x30\"     bike   5' L3 5' L3 5 min   Supine hip flexor stretch        Supine hamstring stretch        Leg press 3x10 205 lbs  3x10 205 lbs 225# 3x10 245# 3x10 plate 5, back 4  3x10 205 lbs                            Modalities        MHP                         "

## 2024-12-03 NOTE — LETTER
December 3, 2024    Ronda Carver MD  [address]  [address]  [facility] [address]    Patient: Facundo Henson   YOB: 1950   Date of Visit: 12/3/2024     Encounter Diagnosis     ICD-10-CM    1. Acute right-sided low back pain without sciatica  M54.50           Dear Dr. Carver:    Thank you for your recent referral of Facundo Henson. Please review the attached evaluation summary from Facundo's recent visit.     Please verify that you agree with the plan of care by signing the attached order.     If you have any questions or concerns, please do not hesitate to call.     I sincerely appreciate the opportunity to share in the care of one of your patients and hope to have another opportunity to work with you in the near future.       Sincerely,    Kei Hernadez, PT      Referring Provider:      I certify that I have read the below Plan of Care and certify the need for these services furnished under this plan of treatment while under my care.                    Ronda Carver MD  [address]  [facility] [address]  Via Fax: [phone]          Daily Note     Today's date: 12/3/2024  Patient name: Facundo Henson  : 1950  MRN: [id number]  Referring provider: Ronda Carver MD  Dx:   Encounter Diagnosis     ICD-10-CM    1. Acute right-sided low back pain without sciatica  M54.50                      Subjective: Patient reports with increase in low back pain since discontinuing PT. Notes difficulty walking and standing for prolonged periods.      Objective: See treatment diary below    Pain  Current pain ratin  At best pain ratin  At worst pain ratin    Neurological Testing      Sensation      Lumbar   Left   Intact: light touch     Right   Intact: light touch     Active Range of Motion      Lumbar   Flexion:  Restriction level: moderate  Extension:  Restriction level: maximal     Strength/Myotome Testing      Left Hip   Planes of Motion   Flexion: 4  Extension: 3+  Abduction:  3-     Right Hip   Planes of Motion   Flexion: 4  Extension: 3+  Abduction: 3-     Left Knee   Flexion: 4  Extension: 4     Right Knee   Flexion: 4  Extension: 4     Left Ankle/Foot   Dorsiflexion: 4     Right Ankle/Foot   Dorsiflexion: 4     Tests      Lumbar      Left   Negative crossed SLR, femoral stretch and passive SLR.      Right   Negative crossed SLR, femoral stretch and passive SLR.      General Comments:        Hip Comments   Modified néstor test sidelying: positive B (lacking 5 deg hip extension)  Hamstring 90/90: 125 deg B    Assessment: Tolerated treatment well. Patient would benefit from continued PT.    Patient is being re-evaluated after having period of time off since last PT session and is having increase in right sided low back pain. No red flags noted and patient denies numbness/tingling. Patient presents with decreased lumbar mobility, decreased motor coordination of core musculature, and suspected directional preference based on subjective/objective findings. Due to these impairments, patient is still limited functionally with prolonged standing and walking, which is affecting is ability to complete his daily house and work tasks. Patient will continue to benefit from skilled PT services to address current impairments and functional limitations to help patient return to his PLOF.     Understanding of Dx/Px/POC: good     Prognosis: good     Goals  STG  1. Patient will be independent with completion of HEP throughout therapy - NOT MET  2. Patient will stand up without increase in difficulty in 2 weeks. - MET  LTG  1. Patient will stand for prolonged periods without increase in symptoms so patient can complete more household tasks with less difficulty in 4 weeks. - NOT MET  2. Patient will ambulate for prolonged periods without increase in symptoms so patient can navigate throughout the community with less difficulty in 4 weeks. - NOT MET        Plan  Patient would benefit from: skilled physical  "therapy     Planned therapy interventions: abdominal trunk stabilization, activity modification, joint mobilization, manual therapy, motor coordination training, neuromuscular re-education, body mechanics training, patient/caregiver education, strengthening, stretching, therapeutic activities, therapeutic exercise, home exercise program, flexibility and functional ROM exercises     Frequency: 1x week  Duration in weeks: 8  Treatment plan discussed with: patient      Plan: Progress treatment as tolerated.       Precautions: Type II DM, peripheral neuropathy,     Manuals 10/8 10/15 11/5 11/12 12/3   DKTC        R hip stretch/PROM MK MK SH CS MK                   Neuro Re-Ed   11/5     sidestepping 2 laps 2 ways each blue  2 laps 2 ways each blue Sidestepping 3 laps blue 3 laps blue  3 laps blue   Standing band anti-rotations 20x 8 lbs B        SLS w/ band hip abd  2x12 blue       Ther Ex   11/5     Seated lumbar flexion 10x 10\" holds fwd and L  10x 10\" holds fwd and L 10\"x10 ea fw and to L 10x10\" ea fw and to L  10x 5\" holds fwd and to l    SKTC        Supine piriformis stretch   Seated 30\"x3 R Seated 3x30\"     bike   5' L3 5' L3 5 min   Supine hip flexor stretch        Supine hamstring stretch        Leg press 3x10 205 lbs  3x10 205 lbs 225# 3x10 245# 3x10 plate 5, back 4  3x10 205 lbs                            Modalities        MHP                                         "

## 2024-12-05 ENCOUNTER — OFFICE VISIT (OUTPATIENT)
Dept: PHYSICAL THERAPY | Facility: CLINIC | Age: 74
End: 2024-12-05
Payer: COMMERCIAL

## 2024-12-05 DIAGNOSIS — M54.50 ACUTE RIGHT-SIDED LOW BACK PAIN WITHOUT SCIATICA: Primary | ICD-10-CM

## 2024-12-05 PROCEDURE — 97110 THERAPEUTIC EXERCISES: CPT | Performed by: PHYSICAL THERAPIST

## 2024-12-05 PROCEDURE — 97140 MANUAL THERAPY 1/> REGIONS: CPT | Performed by: PHYSICAL THERAPIST

## 2024-12-05 PROCEDURE — 97112 NEUROMUSCULAR REEDUCATION: CPT | Performed by: PHYSICAL THERAPIST

## 2024-12-05 NOTE — PROGRESS NOTES
"Daily Note     Today's date: 2024  Patient name: Facundo Henson  : 1950  MRN: [id number]  Referring provider: Ronda Carver MD  Dx:   Encounter Diagnosis     ICD-10-CM    1. Acute right-sided low back pain without sciatica  M54.50                      Subjective: Patient reports thinks noticing slight improvement.      Objective: See treatment diary below    Assessment: Tolerated treatment well. Patient would benefit from continued PT.      Plan: Progress treatment as tolerated.       Precautions: Type II DM, peripheral neuropathy,     Manuals 10/15 11/5 11/12 12/3 12/5   DKTC        R hip stretch/PROM MK SH CS MK MK                   Neuro Re-Ed        sidestepping 2 laps 2 ways each blue Sidestepping 3 laps blue 3 laps blue  3 laps blue    Standing band anti-rotations        SLS w/ band hip abd 2x12 blue     2x12 dark blue    Ther Ex        Seated lumbar flexion 10x 10\" holds fwd and L 10\"x10 ea fw and to L 10x10\" ea fw and to L  10x 5\" holds fwd and to l  12x 5\" holds both ways    SKTC        Supine piriformis stretch  Seated 30\"x3 R Seated 3x30\"      bike  5' L3 5' L3 5 min 5 min   Supine hip flexor stretch        Supine hamstring stretch        Leg press 3x10 205 lbs 225# 3x10 245# 3x10 plate 5, back 4  3x10 205 lbs  3x10 225 lbs                           Modalities        MHP                         "

## 2024-12-09 ENCOUNTER — OFFICE VISIT (OUTPATIENT)
Dept: PHYSICAL THERAPY | Facility: CLINIC | Age: 74
End: 2024-12-09
Payer: COMMERCIAL

## 2024-12-09 DIAGNOSIS — M54.50 ACUTE RIGHT-SIDED LOW BACK PAIN WITHOUT SCIATICA: Primary | ICD-10-CM

## 2024-12-09 PROCEDURE — 97140 MANUAL THERAPY 1/> REGIONS: CPT | Performed by: PHYSICAL THERAPIST

## 2024-12-09 PROCEDURE — 97110 THERAPEUTIC EXERCISES: CPT | Performed by: PHYSICAL THERAPIST

## 2024-12-09 PROCEDURE — 97112 NEUROMUSCULAR REEDUCATION: CPT | Performed by: PHYSICAL THERAPIST

## 2024-12-09 NOTE — PROGRESS NOTES
"Daily Note     Today's date: 2024  Patient name: Facundo Henson  : 1950  MRN: [id number]  Referring provider: Ronad Carver MD  Dx:   Encounter Diagnosis     ICD-10-CM    1. Acute right-sided low back pain without sciatica  M54.50                      Subjective: Patient reports thinks noticing improvement.      Objective: See treatment diary below    Assessment: Tolerated treatment well. Patient would benefit from continued PT.      Plan: Progress treatment as tolerated.       Precautions: Type II DM, peripheral neuropathy,     Manuals 11/5 11/12 12/3 12/5 12/9   DKTC        R hip stretch/PROM SH CS MK MK MK                   Neuro Re-Ed        sidestepping Sidestepping 3 laps blue 3 laps blue  3 laps blue  3 laps red   Standing band anti-rotations        SLS w/ band hip abd    2x12 dark blue  2x10 red   Ther Ex        Seated lumbar flexion 10\"x10 ea fw and to L 10x10\" ea fw and to L  10x 5\" holds fwd and to l  12x 5\" holds both ways  12x 5\" holds both ways    SKTC        Supine piriformis stretch Seated 30\"x3 R Seated 3x30\"       bike 5' L3 5' L3 5 min 5 min 5 min   Supine hip flexor stretch        Supine hamstring stretch        Leg press 225# 3x10 245# 3x10 plate 5, back 4  3x10 205 lbs  3x10 225 lbs 3x10 225 lbs                           Modalities        MHP                         "

## 2024-12-11 ENCOUNTER — APPOINTMENT (OUTPATIENT)
Dept: PHYSICAL THERAPY | Facility: CLINIC | Age: 74
End: 2024-12-11
Payer: COMMERCIAL

## 2024-12-19 ENCOUNTER — OFFICE VISIT (OUTPATIENT)
Dept: PHYSICAL THERAPY | Facility: CLINIC | Age: 74
End: 2024-12-19
Payer: COMMERCIAL

## 2024-12-19 DIAGNOSIS — M54.50 ACUTE RIGHT-SIDED LOW BACK PAIN WITHOUT SCIATICA: Primary | ICD-10-CM

## 2024-12-19 PROCEDURE — 97110 THERAPEUTIC EXERCISES: CPT

## 2024-12-19 PROCEDURE — 97112 NEUROMUSCULAR REEDUCATION: CPT

## 2024-12-19 PROCEDURE — 97140 MANUAL THERAPY 1/> REGIONS: CPT

## 2024-12-19 NOTE — PROGRESS NOTES
"Daily Note     Today's date: 2024  Patient name: Facundo Henson  : 1950  MRN: [id number]  Referring provider: Ronda Carver MD  Dx:   Encounter Diagnosis     ICD-10-CM    1. Acute right-sided low back pain without sciatica  M54.50                      Subjective: Continued improvement in R hip pain and is compliant with HEP.    Objective: See treatment diary below     Precautions: Type II DM, peripheral neuropathy,     Manuals 12/3 12/5 12/9 12/19     DKTC         R hip stretch/PROM MK MK MK SH                       Neuro Re-Ed         sidestepping 3 laps blue  3 laps red 3 laps red     Standing band anti-rotations         SLS w/ band hip abd  2x12 dark blue  2x10 red 2x10 R red     Ther Ex         Seated lumbar flexion 10x 5\" holds fwd and to l  12x 5\" holds both ways  12x 5\" holds both ways  10\"x5 fw     SKTC         Supine piriformis stretch         bike 5 min 5 min 5 min 5'     Supine hip flexor stretch         Supine hamstring stretch         Leg press 3x10 205 lbs  3x10 225 lbs 3x10 225# 225# 2x12                                Modalities         MHP                     Assessment: Tolerated treatment well. Patient demonstrated fatigue post treatment, exhibited good technique with therapeutic exercises, and would benefit from continued PT    Plan: Continue per plan of care.  Progress treatment as tolerated.    Lina Winston    "

## 2024-12-24 ENCOUNTER — OFFICE VISIT (OUTPATIENT)
Dept: PHYSICAL THERAPY | Facility: CLINIC | Age: 74
End: 2024-12-24
Payer: COMMERCIAL

## 2024-12-24 DIAGNOSIS — M54.50 ACUTE RIGHT-SIDED LOW BACK PAIN WITHOUT SCIATICA: Primary | ICD-10-CM

## 2024-12-24 PROCEDURE — 97112 NEUROMUSCULAR REEDUCATION: CPT

## 2024-12-24 PROCEDURE — 97140 MANUAL THERAPY 1/> REGIONS: CPT

## 2024-12-24 PROCEDURE — 97110 THERAPEUTIC EXERCISES: CPT

## 2024-12-24 NOTE — PROGRESS NOTES
"Daily Note     Today's date: 2024  Patient name: Facundo Henson  : 1950  MRN: [id number]  Referring provider: Ronda Carver MD  Dx:   Encounter Diagnosis     ICD-10-CM    1. Acute right-sided low back pain without sciatica  M54.50                      Subjective: Improving back and R hip pain. Pt notes compliance to HEP.    Objective: See treatment diary below     Precautions: Type II DM, peripheral neuropathy,     Manuals 12/3 12/5 12/9 12/19 12/24    DKTC         R hip stretch/PROM MK MK MK SH SH                      Neuro Re-Ed        sidestepping 3 laps blue  3 laps red 3 laps red 3 laps red    Standing band anti-rotations         SLS w/ band hip abd  2x12 dark blue  2x10 red 2x10 R red 2x10 ea red    Ther Ex        Seated lumbar flexion 10x 5\" holds fwd and to l  12x 5\" holds both ways  12x 5\" holds both ways  10\"x5 fw 10\"x5 fw and to L    SKTC         Supine piriformis stretch     Seated 30\"x3 R    bike 5 min 5 min 5 min 5' 5' L3    Supine hip flexor stretch         Supine hamstring stretch         Leg press 3x10 205 lbs  3x10 225 lbs 3x10 225# 225# 2x12 225# 3x10                               Modalities         MHP                     Assessment: Tolerated treatment well. Patient demonstrated fatigue post treatment, exhibited good technique with therapeutic exercises, and would benefit from continued PT    Plan: Continue per plan of care.  Progress treatment as tolerated.     Lina Winston    "

## 2024-12-26 ENCOUNTER — OFFICE VISIT (OUTPATIENT)
Dept: PHYSICAL THERAPY | Facility: CLINIC | Age: 74
End: 2024-12-26
Payer: COMMERCIAL

## 2024-12-26 DIAGNOSIS — M54.50 ACUTE RIGHT-SIDED LOW BACK PAIN WITHOUT SCIATICA: Primary | ICD-10-CM

## 2024-12-26 PROCEDURE — 97140 MANUAL THERAPY 1/> REGIONS: CPT

## 2024-12-26 PROCEDURE — 97110 THERAPEUTIC EXERCISES: CPT

## 2024-12-26 PROCEDURE — 97112 NEUROMUSCULAR REEDUCATION: CPT

## 2024-12-26 NOTE — PROGRESS NOTES
"Daily Note     Today's date: 2024  Patient name: Facundo Henson  : 1950  MRN: [id number]  Referring provider: Ronda Carver MD  Dx:   Encounter Diagnosis     ICD-10-CM    1. Acute right-sided low back pain without sciatica  M54.50                      Subjective: Improved hip pain, ITB and quad remain TTP.    Objective: See treatment diary below     Precautions: Type II DM, peripheral neuropathy,     Manuals 12/3 12/5 12/9 12/19 12/24 12/26   DKTC         R hip stretch/PROM MK MK MK SH SH SH & distraction                     Neuro Re-Ed       sidestepping 3 laps blue  3 laps red 3 laps red 3 laps red 3 laps red   Standing band anti-rotations         SLS w/ band hip abd  2x12 dark blue  2x10 red 2x10 R red 2x10 ea red 2x10 ea red   Ther Ex       Seated lumbar flexion 10x 5\" holds fwd and to l  12x 5\" holds both ways  12x 5\" holds both ways  10\"x5 fw 10\"x5 fw and to L 10\"x5 ea fw and to L   SKTC         Supine piriformis stretch     Seated 30\"x3 R 30\"x3 ea   bike 5 min 5 min 5 min 5' 5' L3 5' L1   Supine hip flexor stretch         Supine hamstring stretch      Seated 30\"x3 ea w/stool   Leg press 3x10 205 lbs  3x10 225 lbs 3x10 225# 225# 2x12 225# 3x10 225# 3x10                              Modalities         MHP                     Assessment: Tolerated treatment well. Patient demonstrated fatigue post treatment, exhibited good technique with therapeutic exercises, and would benefit from continued PT    Plan: Continue per plan of care.  Progress treatment as tolerated.     Lina Winston    "
Medications

## 2024-12-31 ENCOUNTER — OFFICE VISIT (OUTPATIENT)
Dept: PHYSICAL THERAPY | Facility: CLINIC | Age: 74
End: 2024-12-31
Payer: COMMERCIAL

## 2024-12-31 DIAGNOSIS — M54.50 ACUTE RIGHT-SIDED LOW BACK PAIN WITHOUT SCIATICA: Primary | ICD-10-CM

## 2024-12-31 PROCEDURE — 97110 THERAPEUTIC EXERCISES: CPT

## 2024-12-31 PROCEDURE — 97140 MANUAL THERAPY 1/> REGIONS: CPT

## 2024-12-31 PROCEDURE — 97112 NEUROMUSCULAR REEDUCATION: CPT

## 2024-12-31 NOTE — PROGRESS NOTES
"Daily Note     Today's date: 2024  Patient name: Facundo Henson  : 1950  MRN: [id number]  Referring provider: Ronda Carver MD  Dx:   Encounter Diagnosis     ICD-10-CM    1. Acute right-sided low back pain without sciatica  M54.50                      Subjective: No changes noted in back; continues to feel good.    Objective: See treatment diary below     Precautions: Type II DM, peripheral neuropathy,     Manuals 12/3 12/5 12/9 12/19 12/24 12/26 12/31    DKTC           R hip stretch/PROM MK MK MK SH SH SH & distraction SH                          Neuro Re-Ed        sidestepping 3 laps blue  3 laps red 3 laps red 3 laps red 3 laps red 4 laps red    Standing band anti-rotations           SLS w/ band hip abd  2x12 dark blue  2x10 red 2x10 R red 2x10 ea red 2x10 ea red 20x ea red    Ther Ex        Seated lumbar flexion 10x 5\" holds fwd and to l  12x 5\" holds both ways  12x 5\" holds both ways  10\"x5 fw 10\"x5 fw and to L 10\"x5 ea fw and to L 10\"x5 ea fw and to L    SKTC           Supine piriformis stretch     Seated 30\"x3 R 30\"x3 ea 30\"x3 ea    bike 5 min 5 min 5 min 5' 5' L3 5' L1 5' L1    Supine hip flexor stretch           Supine hamstring stretch      Seated 30\"x3 ea w/stool Seated 30\"x3 ea w/stool    Leg press 3x10 205 lbs  3x10 225 lbs 3x10 225# 225# 2x12 225# 3x10 225# 3x10 225# 3x10                                     Modalities           MHP                         Assessment: Tolerated treatment well. Patient demonstrated fatigue post treatment, exhibited good technique with therapeutic exercises, and would benefit from continued PT    Plan: Continue per plan of care.  Progress treatment as tolerated.     Lina Winston    "

## 2025-01-07 ENCOUNTER — OFFICE VISIT (OUTPATIENT)
Dept: PHYSICAL THERAPY | Facility: CLINIC | Age: 75
End: 2025-01-07
Payer: COMMERCIAL

## 2025-01-07 DIAGNOSIS — M54.50 ACUTE RIGHT-SIDED LOW BACK PAIN WITHOUT SCIATICA: Primary | ICD-10-CM

## 2025-01-07 PROCEDURE — 97112 NEUROMUSCULAR REEDUCATION: CPT

## 2025-01-07 PROCEDURE — 97140 MANUAL THERAPY 1/> REGIONS: CPT

## 2025-01-07 PROCEDURE — 97110 THERAPEUTIC EXERCISES: CPT

## 2025-01-07 NOTE — PROGRESS NOTES
"Daily Note     Today's date: 2025  Patient name: Facundo Henson  : 1950  MRN: [id number]  Referring provider: Ronda Carver MD  Dx:   Encounter Diagnosis     ICD-10-CM    1. Acute right-sided low back pain without sciatica  M54.50                      Subjective: Pt continues to note improvements in R hip and lumbar pain over past month.    Objective: See treatment diary below     Precautions: Type II DM, peripheral neuropathy,     Manuals 12/3 12/5 12/9 12/19 12/24 12/26 12/31 1/7   DKTC           R hip stretch/PROM MK MK MK SH SH SH & distraction SH SH                         Neuro Re-Ed       sidestepping 3 laps blue  3 laps red 3 laps red 3 laps red 3 laps red 4 laps red -   [place] walkouts        15# 5x ea   SLS w/ band hip abd  2x12 dark blue  2x10 red 2x10 R red 2x10 ea red 2x10 ea red 20x ea red 20x ea    Ther Ex       Seated lumbar flexion 10x 5\" holds fwd and to l  12x 5\" holds both ways  12x 5\" holds both ways  10\"x5 fw 10\"x5 fw and to L 10\"x5 ea fw and to L 10\"x5 ea fw and to L 10\"x5 ea fw and to L   SKTC           Supine piriformis stretch     Seated 30\"x3 R 30\"x3 ea 30\"x3 ea Seated 30\"x3 R strap   bike 5 min 5 min 5 min 5' 5' L3 5' L1 5' L1 5' L2   Supine hip flexor stretch           Supine hamstring stretch      Seated 30\"x3 ea w/stool Seated 30\"x3 ea w/stool Seated 30\"x3 ea w/stool   Leg press 3x10 205 lbs  3x10 225 lbs 3x10 225# 225# 2x12 225# 3x10 225# 3x10 225# 3x10 245# 3x10                                    Modalities           RUST                         Assessment: Improved mobility noted. Tolerated treatment well. Patient demonstrated fatigue post treatment, exhibited good technique with therapeutic exercises, and would benefit from continued PT    Plan: Continue per plan of care.  Progress treatment as tolerated.    Lina Winston    "

## 2025-01-14 ENCOUNTER — OFFICE VISIT (OUTPATIENT)
Dept: PHYSICAL THERAPY | Facility: CLINIC | Age: 75
End: 2025-01-14
Payer: COMMERCIAL

## 2025-01-14 DIAGNOSIS — M54.50 ACUTE RIGHT-SIDED LOW BACK PAIN WITHOUT SCIATICA: Primary | ICD-10-CM

## 2025-01-14 PROCEDURE — 97110 THERAPEUTIC EXERCISES: CPT

## 2025-01-14 PROCEDURE — 97140 MANUAL THERAPY 1/> REGIONS: CPT

## 2025-01-14 PROCEDURE — 97112 NEUROMUSCULAR REEDUCATION: CPT

## 2025-01-14 NOTE — PROGRESS NOTES
"Daily Note     Today's date: 2025  Patient name: Facundo Henson  : 1950  MRN: [id number]  Referring provider: Ronda Carver MD  Dx:   Encounter Diagnosis     ICD-10-CM    1. Acute right-sided low back pain without sciatica  M54.50                      Subjective: Improved back and hip pain over past month, though he has some lingering tightness from sitting on flight yesterday.    Objective: See treatment diary below     Precautions: Type II DM, peripheral neuropathy,     Manuals      R hip distraction  SH   SH     R hip stretch/PROM SH SH SH SH SH                         Neuro Re-Ed      sidestepping 3 laps red 3 laps red 4 laps red -      Cindi walkouts    15# 5x ea 15# 5x ea     SLS w/ band hip abd 2x10 ea red 2x10 ea red 20x ea red 20x ea red 20x ea red     Ther Ex      Seated lumbar flexion 10\"x5 fw and to L 10\"x5 ea fw and to L 10\"x5 ea fw and to L 10\"x5 ea fw and to L 10\"x5 ea fw and to L     SKTC          Supine piriformis stretch Seated 30\"x3 R 30\"x3 ea 30\"x3 ea Seated 30\"x3 R strap Seated w/strap  30\"x3 ea     bike 5' L3 5' L1 5' L1 5' L2 5' L1     Supine hip flexor stretch          Supine hamstring stretch  Seated 30\"x3 ea w/stool Seated 30\"x3 ea w/stool Seated 30\"x3 ea w/stool Seated 30\"x3 ea w/stool     Leg press 225# 3x10 225# 3x10 225# 3x10 245# 3x10 245# 3x10                                   Modalities          MHP                       Assessment: Tolerated treatment well. Patient demonstrated fatigue post treatment, exhibited good technique with therapeutic exercises, and would benefit from continued PT    Plan: Continue per plan of care.  Progress treatment as tolerated.     Lina Winston    "

## 2025-01-21 ENCOUNTER — APPOINTMENT (OUTPATIENT)
Dept: PHYSICAL THERAPY | Facility: CLINIC | Age: 75
End: 2025-01-21
Payer: COMMERCIAL

## 2025-01-30 ENCOUNTER — TELEPHONE (OUTPATIENT)
Age: 75
End: 2025-01-30

## 2025-01-30 NOTE — TELEPHONE ENCOUNTER
Hello,    Please advise if a forced appointment can be accommodated for the patient:    Call back #: Jeri     Insurance: [employer]    Reason for appointment: Left foot is red, swollen and cannot put pressure on it.  Patient is diabetic     Requested doctor and/or location: Dr Wadsworth/ [facility]       Thank you.